# Patient Record
Sex: MALE | Race: WHITE | URBAN - METROPOLITAN AREA
[De-identification: names, ages, dates, MRNs, and addresses within clinical notes are randomized per-mention and may not be internally consistent; named-entity substitution may affect disease eponyms.]

---

## 2018-01-01 ENCOUNTER — HOSPITAL ENCOUNTER (OUTPATIENT)
Facility: MEDICAL CENTER | Age: 58
End: 2018-03-29
Attending: UROLOGY
Payer: COMMERCIAL

## 2018-01-01 ENCOUNTER — TELEPHONE (OUTPATIENT)
Dept: VASCULAR LAB | Facility: MEDICAL CENTER | Age: 58
End: 2018-01-01

## 2018-01-01 ENCOUNTER — APPOINTMENT (OUTPATIENT)
Dept: RADIOLOGY | Facility: MEDICAL CENTER | Age: 58
DRG: 246 | End: 2018-01-01
Attending: EMERGENCY MEDICINE
Payer: MEDICAID

## 2018-01-01 ENCOUNTER — HOSPITAL ENCOUNTER (INPATIENT)
Facility: MEDICAL CENTER | Age: 58
LOS: 3 days | DRG: 246 | End: 2018-09-28
Attending: EMERGENCY MEDICINE | Admitting: HOSPITALIST
Payer: MEDICAID

## 2018-01-01 ENCOUNTER — HOSPITAL ENCOUNTER (EMERGENCY)
Facility: MEDICAL CENTER | Age: 58
End: 2018-09-29
Attending: EMERGENCY MEDICINE
Payer: MEDICAID

## 2018-01-01 ENCOUNTER — APPOINTMENT (OUTPATIENT)
Dept: CARDIOLOGY | Facility: MEDICAL CENTER | Age: 58
DRG: 246 | End: 2018-01-01
Attending: HOSPITALIST
Payer: MEDICAID

## 2018-01-01 VITALS
WEIGHT: 169.97 LBS | TEMPERATURE: 97 F | DIASTOLIC BLOOD PRESSURE: 64 MMHG | HEART RATE: 80 BPM | BODY MASS INDEX: 25.18 KG/M2 | HEIGHT: 69 IN | OXYGEN SATURATION: 94 % | SYSTOLIC BLOOD PRESSURE: 116 MMHG | RESPIRATION RATE: 18 BRPM

## 2018-01-01 VITALS — WEIGHT: 169 LBS | BODY MASS INDEX: 24.96 KG/M2

## 2018-01-01 DIAGNOSIS — I21.09 ACUTE ANTERIOR WALL MI (HCC): ICD-10-CM

## 2018-01-01 DIAGNOSIS — I46.9 CARDIAC ARREST (HCC): ICD-10-CM

## 2018-01-01 LAB
ACT BLD: 373 SEC (ref 74–137)
ALBUMIN SERPL BCP-MCNC: 4 G/DL (ref 3.2–4.9)
ALBUMIN/GLOB SERPL: 1.3 G/DL
ALP SERPL-CCNC: 32 U/L (ref 30–99)
ALT SERPL-CCNC: 38 U/L (ref 2–50)
ANION GAP SERPL CALC-SCNC: 10 MMOL/L (ref 0–11.9)
ANION GAP SERPL CALC-SCNC: 7 MMOL/L (ref 0–11.9)
ANION GAP SERPL CALC-SCNC: 8 MMOL/L (ref 0–11.9)
ANION GAP SERPL CALC-SCNC: 8 MMOL/L (ref 0–11.9)
APTT PPP: 23.1 SEC (ref 24.7–36)
AST SERPL-CCNC: 101 U/L (ref 12–45)
BASOPHILS # BLD AUTO: 0.3 % (ref 0–1.8)
BASOPHILS # BLD: 0.03 K/UL (ref 0–0.12)
BILIRUB SERPL-MCNC: 1 MG/DL (ref 0.1–1.5)
BNP SERPL-MCNC: 400 PG/ML (ref 0–100)
BUN SERPL-MCNC: 11 MG/DL (ref 8–22)
BUN SERPL-MCNC: 11 MG/DL (ref 8–22)
BUN SERPL-MCNC: 14 MG/DL (ref 8–22)
BUN SERPL-MCNC: 21 MG/DL (ref 8–22)
CALCIUM SERPL-MCNC: 10.1 MG/DL (ref 8.5–10.5)
CALCIUM SERPL-MCNC: 9.2 MG/DL (ref 8.5–10.5)
CALCIUM SERPL-MCNC: 9.9 MG/DL (ref 8.5–10.5)
CALCIUM SERPL-MCNC: 9.9 MG/DL (ref 8.5–10.5)
CHLORIDE SERPL-SCNC: 101 MMOL/L (ref 96–112)
CHLORIDE SERPL-SCNC: 101 MMOL/L (ref 96–112)
CHLORIDE SERPL-SCNC: 102 MMOL/L (ref 96–112)
CHLORIDE SERPL-SCNC: 104 MMOL/L (ref 96–112)
CHOLEST SERPL-MCNC: 123 MG/DL (ref 100–199)
CO2 SERPL-SCNC: 24 MMOL/L (ref 20–33)
CO2 SERPL-SCNC: 24 MMOL/L (ref 20–33)
CO2 SERPL-SCNC: 25 MMOL/L (ref 20–33)
CO2 SERPL-SCNC: 27 MMOL/L (ref 20–33)
CREAT SERPL-MCNC: 0.92 MG/DL (ref 0.5–1.4)
CREAT SERPL-MCNC: 0.99 MG/DL (ref 0.5–1.4)
CREAT SERPL-MCNC: 0.99 MG/DL (ref 0.5–1.4)
CREAT SERPL-MCNC: 1.16 MG/DL (ref 0.5–1.4)
CYTOLOGY REG CYTOL: NORMAL
EKG IMPRESSION: NORMAL
EOSINOPHIL # BLD AUTO: 0.15 K/UL (ref 0–0.51)
EOSINOPHIL NFR BLD: 1.4 % (ref 0–6.9)
ERYTHROCYTE [DISTWIDTH] IN BLOOD BY AUTOMATED COUNT: 41.9 FL (ref 35.9–50)
ERYTHROCYTE [DISTWIDTH] IN BLOOD BY AUTOMATED COUNT: 43 FL (ref 35.9–50)
ERYTHROCYTE [DISTWIDTH] IN BLOOD BY AUTOMATED COUNT: 43.7 FL (ref 35.9–50)
EST. AVERAGE GLUCOSE BLD GHB EST-MCNC: 123 MG/DL
GLOBULIN SER CALC-MCNC: 3 G/DL (ref 1.9–3.5)
GLUCOSE SERPL-MCNC: 113 MG/DL (ref 65–99)
GLUCOSE SERPL-MCNC: 118 MG/DL (ref 65–99)
GLUCOSE SERPL-MCNC: 129 MG/DL (ref 65–99)
GLUCOSE SERPL-MCNC: 77 MG/DL (ref 65–99)
HBA1C MFR BLD: 5.9 % (ref 0–5.6)
HCT VFR BLD AUTO: 38.7 % (ref 42–52)
HCT VFR BLD AUTO: 41.5 % (ref 42–52)
HCT VFR BLD AUTO: 45.8 % (ref 42–52)
HDLC SERPL-MCNC: 37 MG/DL
HGB BLD-MCNC: 12.7 G/DL (ref 14–18)
HGB BLD-MCNC: 14.1 G/DL (ref 14–18)
HGB BLD-MCNC: 15.7 G/DL (ref 14–18)
IMM GRANULOCYTES # BLD AUTO: 0.03 K/UL (ref 0–0.11)
IMM GRANULOCYTES NFR BLD AUTO: 0.3 % (ref 0–0.9)
INR PPP: 0.97 (ref 0.87–1.13)
LDLC SERPL CALC-MCNC: 70 MG/DL
LIPASE SERPL-CCNC: 21 U/L (ref 11–82)
LV EJECT FRACT  99904: 25
LV EJECT FRACT MOD 2C 99903: 36.91
LV EJECT FRACT MOD 4C 99902: 29.38
LV EJECT FRACT MOD BP 99901: 37.74
LYMPHOCYTES # BLD AUTO: 1.86 K/UL (ref 1–4.8)
LYMPHOCYTES NFR BLD: 16.8 % (ref 22–41)
MAGNESIUM SERPL-MCNC: 2 MG/DL (ref 1.5–2.5)
MAGNESIUM SERPL-MCNC: 2.1 MG/DL (ref 1.5–2.5)
MAGNESIUM SERPL-MCNC: 2.2 MG/DL (ref 1.5–2.5)
MCH RBC QN AUTO: 29.6 PG (ref 27–33)
MCH RBC QN AUTO: 30 PG (ref 27–33)
MCH RBC QN AUTO: 30.5 PG (ref 27–33)
MCHC RBC AUTO-ENTMCNC: 32.9 G/DL (ref 33.7–35.3)
MCHC RBC AUTO-ENTMCNC: 34 G/DL (ref 33.7–35.3)
MCHC RBC AUTO-ENTMCNC: 34.3 G/DL (ref 33.7–35.3)
MCV RBC AUTO: 87.4 FL (ref 81.4–97.8)
MCV RBC AUTO: 89.6 FL (ref 81.4–97.8)
MCV RBC AUTO: 90 FL (ref 81.4–97.8)
MONOCYTES # BLD AUTO: 1.35 K/UL (ref 0–0.85)
MONOCYTES NFR BLD AUTO: 12.2 % (ref 0–13.4)
NEUTROPHILS # BLD AUTO: 7.66 K/UL (ref 1.82–7.42)
NEUTROPHILS NFR BLD: 69 % (ref 44–72)
NRBC # BLD AUTO: 0 K/UL
NRBC BLD-RTO: 0 /100 WBC
PLATELET # BLD AUTO: 220 K/UL (ref 164–446)
PLATELET # BLD AUTO: 242 K/UL (ref 164–446)
PLATELET # BLD AUTO: 264 K/UL (ref 164–446)
PMV BLD AUTO: 9.1 FL (ref 9–12.9)
PMV BLD AUTO: 9.5 FL (ref 9–12.9)
PMV BLD AUTO: 9.7 FL (ref 9–12.9)
POTASSIUM SERPL-SCNC: 3.9 MMOL/L (ref 3.6–5.5)
POTASSIUM SERPL-SCNC: 4.1 MMOL/L (ref 3.6–5.5)
POTASSIUM SERPL-SCNC: 4.3 MMOL/L (ref 3.6–5.5)
POTASSIUM SERPL-SCNC: 4.6 MMOL/L (ref 3.6–5.5)
PROT SERPL-MCNC: 7 G/DL (ref 6–8.2)
PROTHROMBIN TIME: 13 SEC (ref 12–14.6)
RBC # BLD AUTO: 4.22 M/UL (ref 4.7–6.1)
RBC # BLD AUTO: 4.63 M/UL (ref 4.7–6.1)
RBC # BLD AUTO: 5.24 M/UL (ref 4.7–6.1)
SODIUM SERPL-SCNC: 135 MMOL/L (ref 135–145)
SODIUM SERPL-SCNC: 136 MMOL/L (ref 135–145)
TRIGL SERPL-MCNC: 81 MG/DL (ref 0–149)
TROPONIN I SERPL-MCNC: 36.91 NG/ML (ref 0–0.04)
WBC # BLD AUTO: 11.1 K/UL (ref 4.8–10.8)
WBC # BLD AUTO: 11.5 K/UL (ref 4.8–10.8)
WBC # BLD AUTO: 11.5 K/UL (ref 4.8–10.8)

## 2018-01-01 PROCEDURE — 700101 HCHG RX REV CODE 250

## 2018-01-01 PROCEDURE — C1725 CATH, TRANSLUMIN NON-LASER: HCPCS

## 2018-01-01 PROCEDURE — 83735 ASSAY OF MAGNESIUM: CPT

## 2018-01-01 PROCEDURE — 80061 LIPID PANEL: CPT

## 2018-01-01 PROCEDURE — 99232 SBSQ HOSP IP/OBS MODERATE 35: CPT | Performed by: INTERNAL MEDICINE

## 2018-01-01 PROCEDURE — 770020 HCHG ROOM/CARE - TELE (206)

## 2018-01-01 PROCEDURE — 700102 HCHG RX REV CODE 250 W/ 637 OVERRIDE(OP): Performed by: HOSPITALIST

## 2018-01-01 PROCEDURE — 80048 BASIC METABOLIC PNL TOTAL CA: CPT

## 2018-01-01 PROCEDURE — A9270 NON-COVERED ITEM OR SERVICE: HCPCS | Performed by: INTERNAL MEDICINE

## 2018-01-01 PROCEDURE — 93454 CORONARY ARTERY ANGIO S&I: CPT | Mod: XU

## 2018-01-01 PROCEDURE — 700105 HCHG RX REV CODE 258

## 2018-01-01 PROCEDURE — 99239 HOSP IP/OBS DSCHRG MGMT >30: CPT | Performed by: INTERNAL MEDICINE

## 2018-01-01 PROCEDURE — 71045 X-RAY EXAM CHEST 1 VIEW: CPT

## 2018-01-01 PROCEDURE — 93010 ELECTROCARDIOGRAM REPORT: CPT | Performed by: INTERNAL MEDICINE

## 2018-01-01 PROCEDURE — 36415 COLL VENOUS BLD VENIPUNCTURE: CPT

## 2018-01-01 PROCEDURE — G8980 MOBILITY D/C STATUS: HCPCS | Mod: CI

## 2018-01-01 PROCEDURE — 770022 HCHG ROOM/CARE - ICU (200)

## 2018-01-01 PROCEDURE — 99153 MOD SED SAME PHYS/QHP EA: CPT

## 2018-01-01 PROCEDURE — C1769 GUIDE WIRE: HCPCS

## 2018-01-01 PROCEDURE — 700102 HCHG RX REV CODE 250 W/ 637 OVERRIDE(OP): Performed by: INTERNAL MEDICINE

## 2018-01-01 PROCEDURE — C1887 CATHETER, GUIDING: HCPCS

## 2018-01-01 PROCEDURE — C1894 INTRO/SHEATH, NON-LASER: HCPCS

## 2018-01-01 PROCEDURE — G8979 MOBILITY GOAL STATUS: HCPCS | Mod: CI

## 2018-01-01 PROCEDURE — A9270 NON-COVERED ITEM OR SERVICE: HCPCS | Performed by: EMERGENCY MEDICINE

## 2018-01-01 PROCEDURE — 92950 HEART/LUNG RESUSCITATION CPR: CPT

## 2018-01-01 PROCEDURE — 85027 COMPLETE CBC AUTOMATED: CPT

## 2018-01-01 PROCEDURE — 700105 HCHG RX REV CODE 258: Performed by: HOSPITALIST

## 2018-01-01 PROCEDURE — C9601 PERC DRUG-EL COR STENT BRAN: HCPCS | Mod: RC

## 2018-01-01 PROCEDURE — 700111 HCHG RX REV CODE 636 W/ 250 OVERRIDE (IP): Performed by: HOSPITALIST

## 2018-01-01 PROCEDURE — 85347 COAGULATION TIME ACTIVATED: CPT

## 2018-01-01 PROCEDURE — 93005 ELECTROCARDIOGRAM TRACING: CPT | Performed by: EMERGENCY MEDICINE

## 2018-01-01 PROCEDURE — 90732 PPSV23 VACC 2 YRS+ SUBQ/IM: CPT | Performed by: HOSPITALIST

## 2018-01-01 PROCEDURE — 84484 ASSAY OF TROPONIN QUANT: CPT

## 2018-01-01 PROCEDURE — 93005 ELECTROCARDIOGRAM TRACING: CPT | Performed by: INTERNAL MEDICINE

## 2018-01-01 PROCEDURE — 83690 ASSAY OF LIPASE: CPT

## 2018-01-01 PROCEDURE — 93005 ELECTROCARDIOGRAM TRACING: CPT

## 2018-01-01 PROCEDURE — 99291 CRITICAL CARE FIRST HOUR: CPT | Performed by: INTERNAL MEDICINE

## 2018-01-01 PROCEDURE — G8978 MOBILITY CURRENT STATUS: HCPCS | Mod: CI

## 2018-01-01 PROCEDURE — 94770 HCHG CO2 EXPIRED GAS DETERMINATION: CPT

## 2018-01-01 PROCEDURE — 83036 HEMOGLOBIN GLYCOSYLATED A1C: CPT

## 2018-01-01 PROCEDURE — 90686 IIV4 VACC NO PRSV 0.5 ML IM: CPT | Performed by: HOSPITALIST

## 2018-01-01 PROCEDURE — A9270 NON-COVERED ITEM OR SERVICE: HCPCS | Performed by: NURSE PRACTITIONER

## 2018-01-01 PROCEDURE — 700117 HCHG RX CONTRAST REV CODE 255: Performed by: INTERNAL MEDICINE

## 2018-01-01 PROCEDURE — 92941 PRQ TRLML REVSC TOT OCCL AMI: CPT | Mod: LD | Performed by: INTERNAL MEDICINE

## 2018-01-01 PROCEDURE — 360979 HCHG DIAGNOSTIC CATH

## 2018-01-01 PROCEDURE — 80053 COMPREHEN METABOLIC PANEL: CPT

## 2018-01-01 PROCEDURE — A9270 NON-COVERED ITEM OR SERVICE: HCPCS | Performed by: HOSPITALIST

## 2018-01-01 PROCEDURE — 700102 HCHG RX REV CODE 250 W/ 637 OVERRIDE(OP): Performed by: NURSE PRACTITIONER

## 2018-01-01 PROCEDURE — 99232 SBSQ HOSP IP/OBS MODERATE 35: CPT | Mod: 25 | Performed by: HOSPITALIST

## 2018-01-01 PROCEDURE — 700111 HCHG RX REV CODE 636 W/ 250 OVERRIDE (IP): Performed by: EMERGENCY MEDICINE

## 2018-01-01 PROCEDURE — 700102 HCHG RX REV CODE 250 W/ 637 OVERRIDE(OP): Performed by: EMERGENCY MEDICINE

## 2018-01-01 PROCEDURE — 99406 BEHAV CHNG SMOKING 3-10 MIN: CPT | Mod: 25 | Performed by: HOSPITALIST

## 2018-01-01 PROCEDURE — 83880 ASSAY OF NATRIURETIC PEPTIDE: CPT

## 2018-01-01 PROCEDURE — 027237Z DILATION OF CORONARY ARTERY, THREE ARTERIES WITH FOUR OR MORE DRUG-ELUTING INTRALUMINAL DEVICES, PERCUTANEOUS APPROACH: ICD-10-PCS | Performed by: INTERNAL MEDICINE

## 2018-01-01 PROCEDURE — 92928 PRQ TCAT PLMT NTRAC ST 1 LES: CPT | Mod: 59,RC | Performed by: INTERNAL MEDICINE

## 2018-01-01 PROCEDURE — 307093 HCHG TR BAND RADIAL

## 2018-01-01 PROCEDURE — 85610 PROTHROMBIN TIME: CPT

## 2018-01-01 PROCEDURE — C9606 PERC D-E COR REVASC W AMI S: HCPCS | Mod: LD

## 2018-01-01 PROCEDURE — 93306 TTE W/DOPPLER COMPLETE: CPT

## 2018-01-01 PROCEDURE — 93306 TTE W/DOPPLER COMPLETE: CPT | Mod: 26 | Performed by: INTERNAL MEDICINE

## 2018-01-01 PROCEDURE — C1874 STENT, COATED/COV W/DEL SYS: HCPCS

## 2018-01-01 PROCEDURE — 700102 HCHG RX REV CODE 250 W/ 637 OVERRIDE(OP)

## 2018-01-01 PROCEDURE — 93454 CORONARY ARTERY ANGIO S&I: CPT | Mod: 26,59 | Performed by: INTERNAL MEDICINE

## 2018-01-01 PROCEDURE — 90471 IMMUNIZATION ADMIN: CPT

## 2018-01-01 PROCEDURE — 99291 CRITICAL CARE FIRST HOUR: CPT

## 2018-01-01 PROCEDURE — A9270 NON-COVERED ITEM OR SERVICE: HCPCS

## 2018-01-01 PROCEDURE — B2111ZZ FLUOROSCOPY OF MULTIPLE CORONARY ARTERIES USING LOW OSMOLAR CONTRAST: ICD-10-PCS | Performed by: INTERNAL MEDICINE

## 2018-01-01 PROCEDURE — 700111 HCHG RX REV CODE 636 W/ 250 OVERRIDE (IP)

## 2018-01-01 PROCEDURE — 3E0234Z INTRODUCTION OF SERUM, TOXOID AND VACCINE INTO MUSCLE, PERCUTANEOUS APPROACH: ICD-10-PCS | Performed by: HOSPITALIST

## 2018-01-01 PROCEDURE — 304952 HCHG R 2 PADS

## 2018-01-01 PROCEDURE — 99152 MOD SED SAME PHYS/QHP 5/>YRS: CPT

## 2018-01-01 PROCEDURE — C9601 PERC DRUG-EL COR STENT BRAN: HCPCS | Mod: LC

## 2018-01-01 PROCEDURE — 85730 THROMBOPLASTIN TIME PARTIAL: CPT

## 2018-01-01 PROCEDURE — 99255 IP/OBS CONSLTJ NEW/EST HI 80: CPT | Performed by: INTERNAL MEDICINE

## 2018-01-01 PROCEDURE — 97161 PT EVAL LOW COMPLEX 20 MIN: CPT

## 2018-01-01 PROCEDURE — 85025 COMPLETE CBC W/AUTO DIFF WBC: CPT

## 2018-01-01 PROCEDURE — 99223 1ST HOSP IP/OBS HIGH 75: CPT | Performed by: HOSPITALIST

## 2018-01-01 PROCEDURE — 99285 EMERGENCY DEPT VISIT HI MDM: CPT

## 2018-01-01 PROCEDURE — 88112 CYTOPATH CELL ENHANCE TECH: CPT

## 2018-01-01 RX ORDER — NITROGLYCERIN 20 MG/100ML
5 INJECTION INTRAVENOUS ONCE
Status: DISCONTINUED | OUTPATIENT
Start: 2018-01-01 | End: 2018-01-01

## 2018-01-01 RX ORDER — SODIUM CHLORIDE 9 MG/ML
INJECTION, SOLUTION INTRAVENOUS CONTINUOUS
Status: ACTIVE | OUTPATIENT
Start: 2018-01-01 | End: 2018-01-01

## 2018-01-01 RX ORDER — LISINOPRIL 5 MG/1
2.5 TABLET ORAL
Status: DISCONTINUED | OUTPATIENT
Start: 2018-01-01 | End: 2018-01-01 | Stop reason: HOSPADM

## 2018-01-01 RX ORDER — SODIUM CHLORIDE 9 MG/ML
500 INJECTION, SOLUTION INTRAVENOUS ONCE
Status: COMPLETED | OUTPATIENT
Start: 2018-01-01 | End: 2018-01-01

## 2018-01-01 RX ORDER — METOPROLOL SUCCINATE 50 MG/1
50 TABLET, EXTENDED RELEASE ORAL EVERY EVENING
Qty: 30 TAB | Refills: 1 | Status: SHIPPED | OUTPATIENT
Start: 2018-01-01

## 2018-01-01 RX ORDER — FUROSEMIDE 20 MG/1
20 TABLET ORAL
Status: DISCONTINUED | OUTPATIENT
Start: 2018-01-01 | End: 2018-01-01

## 2018-01-01 RX ORDER — NITROGLYCERIN 0.4 MG/1
0.4 TABLET SUBLINGUAL ONCE
Status: COMPLETED | OUTPATIENT
Start: 2018-01-01 | End: 2018-01-01

## 2018-01-01 RX ORDER — LIDOCAINE HYDROCHLORIDE 20 MG/ML
INJECTION, SOLUTION INFILTRATION; PERINEURAL
Status: COMPLETED
Start: 2018-01-01 | End: 2018-01-01

## 2018-01-01 RX ORDER — LISINOPRIL 5 MG/1
5 TABLET ORAL
Status: DISCONTINUED | OUTPATIENT
Start: 2018-01-01 | End: 2018-01-01

## 2018-01-01 RX ORDER — FUROSEMIDE 20 MG/1
20 TABLET ORAL
Status: DISCONTINUED | OUTPATIENT
Start: 2018-01-01 | End: 2018-01-01 | Stop reason: HOSPADM

## 2018-01-01 RX ORDER — POTASSIUM CHLORIDE 20 MEQ/1
20 TABLET, EXTENDED RELEASE ORAL 2 TIMES DAILY
Status: DISCONTINUED | OUTPATIENT
Start: 2018-01-01 | End: 2018-01-01

## 2018-01-01 RX ORDER — METOPROLOL SUCCINATE 50 MG/1
50 TABLET, EXTENDED RELEASE ORAL EVERY EVENING
Status: DISCONTINUED | OUTPATIENT
Start: 2018-01-01 | End: 2018-01-01 | Stop reason: HOSPADM

## 2018-01-01 RX ORDER — SPIRONOLACTONE 25 MG/1
25 TABLET ORAL
Status: DISCONTINUED | OUTPATIENT
Start: 2018-01-01 | End: 2018-01-01 | Stop reason: HOSPADM

## 2018-01-01 RX ORDER — METOPROLOL SUCCINATE 50 MG/1
50 TABLET, EXTENDED RELEASE ORAL EVERY EVENING
Status: DISCONTINUED | OUTPATIENT
Start: 2018-01-01 | End: 2018-01-01

## 2018-01-01 RX ORDER — SPIRONOLACTONE 25 MG/1
12.5 TABLET ORAL
Status: DISCONTINUED | OUTPATIENT
Start: 2018-01-01 | End: 2018-01-01

## 2018-01-01 RX ORDER — LISINOPRIL 2.5 MG/1
2.5 TABLET ORAL DAILY
Qty: 30 TAB | Refills: 1 | Status: SHIPPED | OUTPATIENT
Start: 2018-01-01

## 2018-01-01 RX ORDER — ACETAMINOPHEN 325 MG/1
650 TABLET ORAL EVERY 6 HOURS PRN
Status: DISCONTINUED | OUTPATIENT
Start: 2018-01-01 | End: 2018-01-01 | Stop reason: HOSPADM

## 2018-01-01 RX ORDER — ATORVASTATIN CALCIUM 80 MG/1
80 TABLET, FILM COATED ORAL
Qty: 30 TAB | Refills: 1 | Status: SHIPPED | OUTPATIENT
Start: 2018-01-01

## 2018-01-01 RX ORDER — SODIUM CHLORIDE 9 MG/ML
INJECTION, SOLUTION INTRAVENOUS
Status: COMPLETED
Start: 2018-01-01 | End: 2018-01-01

## 2018-01-01 RX ORDER — MIDAZOLAM HYDROCHLORIDE 1 MG/ML
INJECTION INTRAMUSCULAR; INTRAVENOUS
Status: COMPLETED
Start: 2018-01-01 | End: 2018-01-01

## 2018-01-01 RX ORDER — HEPARIN SODIUM,PORCINE 1000/ML
VIAL (ML) INJECTION
Status: COMPLETED
Start: 2018-01-01 | End: 2018-01-01

## 2018-01-01 RX ORDER — LISINOPRIL 5 MG/1
2.5 TABLET ORAL
Status: DISCONTINUED | OUTPATIENT
Start: 2018-01-01 | End: 2018-01-01

## 2018-01-01 RX ORDER — POLYETHYLENE GLYCOL 3350 17 G/17G
1 POWDER, FOR SOLUTION ORAL
Status: DISCONTINUED | OUTPATIENT
Start: 2018-01-01 | End: 2018-01-01 | Stop reason: HOSPADM

## 2018-01-01 RX ORDER — SPIRONOLACTONE 25 MG/1
25 TABLET ORAL DAILY
Qty: 30 TAB | Refills: 3 | Status: SHIPPED | OUTPATIENT
Start: 2018-01-01

## 2018-01-01 RX ORDER — LIDOCAINE HYDROCHLORIDE 10 MG/ML
INJECTION, SOLUTION INFILTRATION; PERINEURAL
Status: COMPLETED
Start: 2018-01-01 | End: 2018-01-01

## 2018-01-01 RX ORDER — VERAPAMIL HYDROCHLORIDE 2.5 MG/ML
INJECTION, SOLUTION INTRAVENOUS
Status: COMPLETED
Start: 2018-01-01 | End: 2018-01-01

## 2018-01-01 RX ORDER — BISACODYL 10 MG
10 SUPPOSITORY, RECTAL RECTAL
Status: DISCONTINUED | OUTPATIENT
Start: 2018-01-01 | End: 2018-01-01 | Stop reason: HOSPADM

## 2018-01-01 RX ORDER — ATORVASTATIN CALCIUM 80 MG/1
80 TABLET, FILM COATED ORAL
Status: DISCONTINUED | OUTPATIENT
Start: 2018-01-01 | End: 2018-01-01 | Stop reason: HOSPADM

## 2018-01-01 RX ORDER — CALCIUM CHLORIDE 100 MG/ML
INJECTION INTRAVENOUS; INTRAVENTRICULAR
Status: COMPLETED | OUTPATIENT
Start: 2018-01-01 | End: 2018-01-01

## 2018-01-01 RX ORDER — ASPIRIN 81 MG/1
81 TABLET ORAL DAILY
Qty: 30 TAB | Refills: 1 | Status: SHIPPED | OUTPATIENT
Start: 2018-01-01

## 2018-01-01 RX ORDER — METOPROLOL SUCCINATE 50 MG/1
100 TABLET, EXTENDED RELEASE ORAL EVERY EVENING
Status: DISCONTINUED | OUTPATIENT
Start: 2018-01-01 | End: 2018-01-01

## 2018-01-01 RX ORDER — ASPIRIN 81 MG/1
324 TABLET, CHEWABLE ORAL ONCE
Status: COMPLETED | OUTPATIENT
Start: 2018-01-01 | End: 2018-01-01

## 2018-01-01 RX ORDER — FUROSEMIDE 20 MG/1
20 TABLET ORAL DAILY
Qty: 60 TAB | Refills: 1 | Status: SHIPPED | OUTPATIENT
Start: 2018-01-01

## 2018-01-01 RX ORDER — NICOTINE 21 MG/24HR
21 PATCH, TRANSDERMAL 24 HOURS TRANSDERMAL
Status: DISCONTINUED | OUTPATIENT
Start: 2018-01-01 | End: 2018-01-01 | Stop reason: HOSPADM

## 2018-01-01 RX ORDER — AMOXICILLIN 250 MG
2 CAPSULE ORAL 2 TIMES DAILY
Status: DISCONTINUED | OUTPATIENT
Start: 2018-01-01 | End: 2018-01-01 | Stop reason: HOSPADM

## 2018-01-01 RX ADMIN — CALCIUM CHLORIDE 1 G: 100 INJECTION, SOLUTION INTRAVENOUS at 17:14

## 2018-01-01 RX ADMIN — SODIUM BICARBONATE 50 MEQ: 84 INJECTION, SOLUTION INTRAVENOUS at 17:16

## 2018-01-01 RX ADMIN — ATORVASTATIN CALCIUM 80 MG: 80 TABLET, FILM COATED ORAL at 21:29

## 2018-01-01 RX ADMIN — ENOXAPARIN SODIUM 40 MG: 100 INJECTION SUBCUTANEOUS at 05:07

## 2018-01-01 RX ADMIN — TICAGRELOR 180 MG: 90 TABLET ORAL at 15:19

## 2018-01-01 RX ADMIN — POTASSIUM CHLORIDE 20 MEQ: 1500 TABLET, EXTENDED RELEASE ORAL at 08:57

## 2018-01-01 RX ADMIN — ASPIRIN 81 MG: 81 TABLET, COATED ORAL at 05:52

## 2018-01-01 RX ADMIN — EPINEPHRINE 1 MG: 0.1 INJECTION, SOLUTION ENDOTRACHEAL; INTRACARDIAC; INTRAVENOUS at 17:09

## 2018-01-01 RX ADMIN — FUROSEMIDE 20 MG: 20 TABLET ORAL at 08:57

## 2018-01-01 RX ADMIN — SPIRONOLACTONE 25 MG: 25 TABLET ORAL at 05:53

## 2018-01-01 RX ADMIN — FUROSEMIDE 20 MG: 20 TABLET ORAL at 17:57

## 2018-01-01 RX ADMIN — SPIRONOLACTONE 12.5 MG: 25 TABLET ORAL at 05:07

## 2018-01-01 RX ADMIN — TICAGRELOR 90 MG: 90 TABLET ORAL at 05:08

## 2018-01-01 RX ADMIN — NITROGLYCERIN 10 ML: 20 INJECTION INTRAVENOUS at 14:45

## 2018-01-01 RX ADMIN — METOPROLOL TARTRATE 25 MG: 25 TABLET, FILM COATED ORAL at 05:07

## 2018-01-01 RX ADMIN — HEPARIN SODIUM 1000 UNITS: 1000 INJECTION, SOLUTION INTRAVENOUS; SUBCUTANEOUS at 15:19

## 2018-01-01 RX ADMIN — FUROSEMIDE 20 MG: 20 TABLET ORAL at 05:07

## 2018-01-01 RX ADMIN — FENTANYL CITRATE 100 MCG: 50 INJECTION INTRAMUSCULAR; INTRAVENOUS at 14:58

## 2018-01-01 RX ADMIN — LISINOPRIL 2.5 MG: 5 TABLET ORAL at 12:08

## 2018-01-01 RX ADMIN — VERAPAMIL HYDROCHLORIDE 2.5 MG: 2.5 INJECTION, SOLUTION INTRAVENOUS at 14:45

## 2018-01-01 RX ADMIN — ASPIRIN 324 MG: 81 TABLET, CHEWABLE ORAL at 13:41

## 2018-01-01 RX ADMIN — HEPARIN SODIUM: 1000 INJECTION, SOLUTION INTRAVENOUS; SUBCUTANEOUS at 14:45

## 2018-01-01 RX ADMIN — ASPIRIN 81 MG: 81 TABLET, COATED ORAL at 06:02

## 2018-01-01 RX ADMIN — ACETAMINOPHEN 650 MG: 325 TABLET, FILM COATED ORAL at 08:57

## 2018-01-01 RX ADMIN — SODIUM CHLORIDE: 9 INJECTION, SOLUTION INTRAVENOUS at 16:27

## 2018-01-01 RX ADMIN — TICAGRELOR 90 MG: 90 TABLET ORAL at 17:36

## 2018-01-01 RX ADMIN — SENNOSIDES AND DOCUSATE SODIUM 2 TABLET: 8.6; 5 TABLET ORAL at 05:08

## 2018-01-01 RX ADMIN — NICOTINE 21 MG: 21 PATCH, EXTENDED RELEASE TRANSDERMAL at 05:53

## 2018-01-01 RX ADMIN — TICAGRELOR 90 MG: 90 TABLET ORAL at 06:03

## 2018-01-01 RX ADMIN — NICOTINE 21 MG: 21 PATCH, EXTENDED RELEASE TRANSDERMAL at 18:15

## 2018-01-01 RX ADMIN — NICOTINE 21 MG: 21 PATCH, EXTENDED RELEASE TRANSDERMAL at 06:05

## 2018-01-01 RX ADMIN — METOPROLOL SUCCINATE 50 MG: 50 TABLET, EXTENDED RELEASE ORAL at 17:37

## 2018-01-01 RX ADMIN — TICAGRELOR 90 MG: 90 TABLET ORAL at 05:53

## 2018-01-01 RX ADMIN — LIDOCAINE HYDROCHLORIDE: 10 INJECTION, SOLUTION INFILTRATION; PERINEURAL at 14:45

## 2018-01-01 RX ADMIN — ATORVASTATIN CALCIUM 80 MG: 80 TABLET, FILM COATED ORAL at 20:04

## 2018-01-01 RX ADMIN — POTASSIUM CHLORIDE 20 MEQ: 1500 TABLET, EXTENDED RELEASE ORAL at 05:07

## 2018-01-01 RX ADMIN — POTASSIUM CHLORIDE 20 MEQ: 1500 TABLET, EXTENDED RELEASE ORAL at 17:57

## 2018-01-01 RX ADMIN — LIDOCAINE HYDROCHLORIDE 100 MG: 20 INJECTION, SOLUTION INTRAVENOUS at 17:13

## 2018-01-01 RX ADMIN — MIDAZOLAM HYDROCHLORIDE 2 MG: 1 INJECTION, SOLUTION INTRAMUSCULAR; INTRAVENOUS at 14:58

## 2018-01-01 RX ADMIN — SODIUM CHLORIDE 500 ML: 9 INJECTION, SOLUTION INTRAVENOUS at 02:54

## 2018-01-01 RX ADMIN — PNEUMOCOCCAL VACCINE POLYVALENT 25 MCG
25; 25; 25; 25; 25; 25; 25; 25; 25; 25; 25; 25; 25; 25; 25; 25; 25; 25; 25; 25; 25; 25; 25 INJECTION, SOLUTION INTRAMUSCULAR; SUBCUTANEOUS at 18:10

## 2018-01-01 RX ADMIN — TICAGRELOR 90 MG: 90 TABLET ORAL at 17:58

## 2018-01-01 RX ADMIN — NITROGLYCERIN 0.4 MG: 0.4 TABLET SUBLINGUAL at 13:41

## 2018-01-01 RX ADMIN — ASPIRIN 81 MG: 81 TABLET, COATED ORAL at 05:08

## 2018-01-01 RX ADMIN — INFLUENZA A VIRUS A/MICHIGAN/45/2015 X-275 (H1N1) ANTIGEN (FORMALDEHYDE INACTIVATED), INFLUENZA A VIRUS A/SINGAPORE/INFIMH-16-0019/2016 IVR-186 (H3N2) ANTIGEN (FORMALDEHYDE INACTIVATED), INFLUENZA B VIRUS B/PHUKET/3073/2013 ANTIGEN (FORMALDEHYDE INACTIVATED), AND INFLUENZA B VIRUS B/MARYLAND/15/2016 BX-69A ANTIGEN (FORMALDEHYDE INACTIVATED) 0.5 ML: 15; 15; 15; 15 INJECTION, SUSPENSION INTRAMUSCULAR at 18:06

## 2018-01-01 RX ADMIN — FUROSEMIDE 20 MG: 20 TABLET ORAL at 05:52

## 2018-01-01 RX ADMIN — SPIRONOLACTONE 12.5 MG: 25 TABLET ORAL at 08:57

## 2018-01-01 RX ADMIN — METOPROLOL TARTRATE 25 MG: 25 TABLET, FILM COATED ORAL at 17:57

## 2018-01-01 RX ADMIN — HEPARIN SODIUM 2000 UNITS: 200 INJECTION, SOLUTION INTRAVENOUS at 14:46

## 2018-01-01 RX ADMIN — ACETAMINOPHEN 650 MG: 325 TABLET, FILM COATED ORAL at 18:15

## 2018-01-01 RX ADMIN — NICOTINE 21 MG: 21 PATCH, EXTENDED RELEASE TRANSDERMAL at 05:07

## 2018-01-01 RX ADMIN — ATORVASTATIN CALCIUM 80 MG: 80 TABLET, FILM COATED ORAL at 22:44

## 2018-01-01 RX ADMIN — NITROGLYCERIN 0.4 MG: 0.4 TABLET SUBLINGUAL at 13:52

## 2018-01-01 RX ADMIN — METOPROLOL TARTRATE 25 MG: 25 TABLET, FILM COATED ORAL at 18:26

## 2018-01-01 RX ADMIN — IOHEXOL 143 ML: 350 INJECTION, SOLUTION INTRAVENOUS at 15:20

## 2018-01-01 ASSESSMENT — ENCOUNTER SYMPTOMS
APNEA: 0
SENSORY CHANGE: 0
NERVOUS/ANXIOUS: 0
FEVER: 0
DEPRESSION: 0
SORE THROAT: 0
BLURRED VISION: 0
COUGH: 0
BACK PAIN: 0
STRIDOR: 0
DIZZINESS: 0
VOMITING: 0
WEAKNESS: 0
NAUSEA: 0
ABDOMINAL PAIN: 0
PALPITATIONS: 0
BLURRED VISION: 0
SHORTNESS OF BREATH: 1
SHORTNESS OF BREATH: 0
SENSORY CHANGE: 0
CHILLS: 0
DEPRESSION: 0
NAUSEA: 0
PALPITATIONS: 0
DOUBLE VISION: 0
ABDOMINAL PAIN: 0
PALPITATIONS: 0
WHEEZING: 0
CHEST TIGHTNESS: 0
SHORTNESS OF BREATH: 0
DIZZINESS: 0
CHOKING: 0
FEVER: 0
FEVER: 0
CHILLS: 0
NERVOUS/ANXIOUS: 0
SHORTNESS OF BREATH: 0
COUGH: 0

## 2018-01-01 ASSESSMENT — GAIT ASSESSMENTS
GAIT LEVEL OF ASSIST: SUPERVISED
DISTANCE (FEET): 300

## 2018-01-01 ASSESSMENT — PAIN SCALES - GENERAL
PAINLEVEL_OUTOF10: 6
PAINLEVEL_OUTOF10: 0
PAINLEVEL_OUTOF10: 5
PAINLEVEL_OUTOF10: 3
PAINLEVEL_OUTOF10: 0
PAINLEVEL_OUTOF10: 5
PAINLEVEL_OUTOF10: 3
PAINLEVEL_OUTOF10: 5
PAINLEVEL_OUTOF10: 8
PAINLEVEL_OUTOF10: 0
PAINLEVEL_OUTOF10: 3
PAINLEVEL_OUTOF10: 8
PAINLEVEL_OUTOF10: 6
PAINLEVEL_OUTOF10: 0
PAINLEVEL_OUTOF10: 0
PAINLEVEL_OUTOF10: 5
PAINLEVEL_OUTOF10: 6
PAINLEVEL_OUTOF10: 10
PAINLEVEL_OUTOF10: 2
PAINLEVEL_OUTOF10: 6
PAINLEVEL_OUTOF10: 0
PAINLEVEL_OUTOF10: 0
PAINLEVEL_OUTOF10: 3
PAINLEVEL_OUTOF10: 6
PAINLEVEL_OUTOF10: 0
PAINLEVEL_OUTOF10: 0
PAINLEVEL_OUTOF10: 6
PAINLEVEL_OUTOF10: 0

## 2018-01-01 ASSESSMENT — PATIENT HEALTH QUESTIONNAIRE - PHQ9
1. LITTLE INTEREST OR PLEASURE IN DOING THINGS: NOT AT ALL
1. LITTLE INTEREST OR PLEASURE IN DOING THINGS: NOT AT ALL
SUM OF ALL RESPONSES TO PHQ9 QUESTIONS 1 AND 2: 0
1. LITTLE INTEREST OR PLEASURE IN DOING THINGS: NOT AT ALL
2. FEELING DOWN, DEPRESSED, IRRITABLE, OR HOPELESS: NOT AT ALL
SUM OF ALL RESPONSES TO PHQ9 QUESTIONS 1 AND 2: 0
1. LITTLE INTEREST OR PLEASURE IN DOING THINGS: NOT AT ALL
SUM OF ALL RESPONSES TO PHQ9 QUESTIONS 1 AND 2: 0
SUM OF ALL RESPONSES TO PHQ9 QUESTIONS 1 AND 2: 0

## 2018-01-01 ASSESSMENT — COGNITIVE AND FUNCTIONAL STATUS - GENERAL
SUGGESTED CMS G CODE MODIFIER MOBILITY: CJ
SUGGESTED CMS G CODE MODIFIER MOBILITY: CH
MOBILITY SCORE: 24
SUGGESTED CMS G CODE MODIFIER DAILY ACTIVITY: CH
DAILY ACTIVITIY SCORE: 24
WALKING IN HOSPITAL ROOM: A LITTLE
MOBILITY SCORE: 22
CLIMB 3 TO 5 STEPS WITH RAILING: A LITTLE

## 2018-01-01 ASSESSMENT — LIFESTYLE VARIABLES
EVER_SMOKED: YES
ALCOHOL_USE: NO

## 2018-09-25 PROBLEM — Z72.0 TOBACCO ABUSE: Status: ACTIVE | Noted: 2018-01-01

## 2018-09-25 PROBLEM — I21.02 ST ELEVATION MYOCARDIAL INFARCTION INVOLVING LEFT ANTERIOR DESCENDING (LAD) CORONARY ARTERY (HCC): Status: ACTIVE | Noted: 2018-01-01

## 2018-09-25 NOTE — DISCHARGE PLANNING
Referral: Stemi    Intervention: SW responded to a STEMI call.   Pt will be going to CATH lab.    Pt requesting his daughter Nakita be called at [phone] left a message .  Pt requested his mother be called at [phone]-Mother is Christel.  SW spoke with her and she lives in [place]. She is getting a ride and will be to the hospital with in the hour.     Daughter Nakita called and was notified-She works until 1600. Then will be here.    Plan: SW to keep in touch with family regarding Pt condition. SW will await family arrival and offer support and information as needed.

## 2018-09-25 NOTE — ED TRIAGE NOTES
".  Chief Complaint   Patient presents with   • Chest Pain     x 2 weeks, generalized chest pain radiating down BUE, (+) SOB, denies N/V     .BP (!) 99/62   Pulse (!) 109   Temp 36.6 °C (97.9 °F)   Resp 16   Ht 1.753 m (5' 9\")   Wt 76 kg (167 lb 8.8 oz)   SpO2 96%   BMI 24.74 kg/m²     Ambulatory to triage with above complaints, EKG done, patient to RD 12 w/ED tech  "

## 2018-09-25 NOTE — CONSULTS
DATE OF SERVICE:  09/25/2018    CARDIOLOGY CONSULTATION    REQUESTING PHYSICIAN:  Liang Henning DO, in the emergency room.    PATIENT IDENTIFICATION:  The patient is a 58-year-old male with no prior   history of coronary artery disease.  Cardiology consultation was requested   because of an acute ST-segment elevation myocardial infarction.    HISTORY OF PRESENT ILLNESS:  Patient states that he has had chest discomfort   intermittently over the last couple of weeks.  This has been a continuous   discomfort over the last couple of days that waxed and waned a bit.  He never   went away completely.  He states that it has been as high as a 10/10.  It is   currently an 8/10.  It is across the anterior chest and is a pressure-type   sensation.  It does radiate to his arms and may be associated with   diaphoresis.  He denies any associated nausea, vomiting, or shortness of   breath.  He notes no aggravating or alleviating factors.    Patient denies any difficulty with dyspnea on exertion, PND, orthopnea, or   edema.  He has had no palpitations or lightheadedness.    PAST MEDICAL HISTORY AND MEDICAL ILLNESSES:  Sciatica and right eye blindness.    ALLERGIES:  None known.    PAST SURGICAL HISTORY:  The patient has had left hip surgery and right wrist   surgery.    MEDICATIONS:  None.    SOCIAL HISTORY:  Patient is  and has one daughter.  He smokes about a   pack of cigarettes daily.  He does not drink.    FAMILY HISTORY:  Remarkable for his father having a coronary artery disease   with bypass graft surgery.  Patient does not know his father's age at the time   of this event.    REVIEW OF SYSTEMS:  Remainder of the [facility] review of systems is unobtainable   due to the fact the patient is going acutely to the cardiac catheterization   laboratory.    PHYSICAL EXAMINATION:  VITAL SIGNS:  Blood pressure 120/60, pulse of 100, respirations 30, O2 sat is   97%.  GENERAL APPEARANCE:  Alert, well-developed, middle-aged male,  in no acute   distress.  HEENT:  Patient's right eye is deviated to the right; however, the left eye   has intact extraocular movements.  Left eye has a normal reactivity.  Mouth,   poor oral hygiene with many carious teeth.  NECK:  No thyromegaly, cervical adenopathy, jugular venous distention noted.  CHEST:  Respiratory is normal.  LUNGS:  Clear to auscultation and percussion.  CARDIAC:  There is a regular rate and rhythm.  S1, S2 are grossly normal.    There is no S3 or S4 appreciated.  No murmurs, clicks, or rubs are noted.  ABDOMEN:  Soft, nontender, no hepatosplenomegaly noted.  MUSCULOSKELETAL:  Patient has no significant kyphoscoliosis.  Muscle strength   and tone are normal.  EXTREMITIES:  No clubbing, cyanosis, or edema is present.  Pulses are 2+ and   symmetrical.  No carotid bruits are noted.  SKIN:  Inspection and palpation noncontributory.  NEUROLOGIC:  No focal neurologic abnormalities noted.  PSYCHIATRIC:  The patient is alert and oriented x3.  Mood and affect are   appropriate.    LABORATORY DATA:  Sodium 135, potassium 4.6, BUN 11, creatinine 0.99.  AST was   mildly elevated at 101.  Troponin was elevated at 36.9.    EKG:  Patient's EKGs were reviewed by myself.  Patient's initial EKG from   12:54 this afternoon showed a mild sinus tachycardia with deep anterior T-wave   inversion.  There was approximately 1 mm of ST-segment elevation, which did   not meet criteria for a STEMI.  Repeat EKG at 14:11 this afternoon again   reviewed by myself.  This again showed sinus rhythm with a rate of   approximately 100 BPM.  There was a QS pattern in leads V1 and V2 with more   ST-segment elevation noted in lead V3.  This is greater than 2 mm.  However,   he did not have greater than 2 mm of ST-segment elevation in 2 contiguous   leads.    IMPRESSION:  1.  Anterior infarction.  The patient does not meet definitive criteria for a   STEMI.  However, given his ST-segment elevation in lead V3 and his ongoing    chest discomfort, he will go acutely to the cardiac catheterization and   revascularization as necessary.  2.  Smoking history.    PLAN:  Cardiac catheterization with revascularization as necessary.       ____________________________________     MD MANJU VILLEDA / BARBARA    DD:  09/25/2018 14:39:18  DT:  09/25/2018 15:55:25    D#:  [id number]  Job#:  [id number]

## 2018-09-25 NOTE — PROCEDURES
Cardiac Catheterization and Percutaneous Intervention Procedure Report    9/25/2018    Referring MD:     Primary cardiologist:      Primary Care Provider: No primary care provider on file.    Indication for procedure: STEMI    Procedure:  · Insertion of 6FR sheath in the right radial artery  · Bilateral coronary arteriograms  · Angioplasty and placement of a 3.0 by 24 mm Synergy drug-eluting stent in proximal  right coronary artery.  · Angioplasty and placement of a 2.25 by 12 mm Synergy drug-eluting stent in  distal left anterior descending coronary artery.  · Angioplasty and placement of a 2.15 by 12 mm Synergy drug-eluting stent in mid obtuse marginal coronary artery.  · Angioplasty and placement of a 4.0 by 38 mm Synergy drug-eluting stent in proximal right coronary artery.      Flynn Olmedo was brought to the cardiac catheterization lab where the right wrist was prepped and draped in the usual manner for cardiac catheterization.  The area was anesthetized with lidocaine and a 6FR sheath was inserted into the right radial artery without difficulty. A #3.5 left Arabella was advanced to the ostia of the left coronary artery and arteriograms were recorded.   A #4 right Arabella was advanced to the ostia of the right coronary artery and arteriograms were recorded.  Left ventriculogram was not performed.  Patient underwent percutaneous coronary revascularization as outlined below.  At the completion of the case the sheath was removed and hemostasis achieved utilizing radial compression band .  Patient was pain-free and hemodynamically stable at the completion of the case.  There were no apparent complications.    Coronary arteriograms:  Left main: normal  Left anterior descending: Proximal LAD has 99% stenosis, distal LAD has 90% stenosis. Diagonals are small vessels.  Left circumflex: After Giving a Large marginal, LCX is a small vessel with 80% stenosis in mid portion. Marginal has thrombotic lesion with  80% stenosis involving the bifurcation.  Right coronary: 90% stenosis in proximal RCA, long lesion, dominant      Interventional Procedure LAD:     Given the patient's clinical presentation and coronary anatomy, PCI was indicated and we proceeded with the intervention as detailed below.    Proximal:  Pre: 99%, 20 mm length, LES 2 flow  Post: 0%, LES 3 flow    Distal:  Pre: 90%, 10 mm length, LES 3 flow  Post: 0%, LES 3 flow    Guide catheter: EBU 3.5 was advanced to the ostia of the left.    Guide wire: Samurai was advanced into the artery and crossed the lesion.    Balloon pre-dilatation: 3.0 by 12 mm Emerge inflated to 8 IMELDA to pre-dilate the lesion.    Stent: 3.0 by 24 mm Synergy drug-eluting deployed successfully at 12 IMELDA.      Additional stent:2.25 by 12 mm Synergy drug-eluting deployed at 12 IMELDA. This is post dilated with 2.25X6 mm NC balloon at 12 atms.    Additional procedures: none       Interventional Procedure OM:     Given the patient's clinical presentation and coronary anatomy, PCI was indicated and we proceeded with the intervention as detailed below.    Pre: 80%, 10 mm length, LES 3 flow  Post: 0%, LES 3 flow    Guide catheter: EBU 3.5 was advanced to the ostia of the left.    Guide wire: Samurai was advanced into the artery and crossed the lesion.    Stent: 2.75 by 12 mm Synergy drug-eluting deployed successfully at 12 IMELDA.   side branch was crossed through the stent struts, dilated with 2.0X12mm emerge balloon. Side branch was jailed but good LES 3 flow.    Interventional Procedure RCA:     Given the patient's clinical presentation and coronary anatomy, PCI was indicated and we proceeded with the intervention as detailed below.    Pre: 90%, 30 mm length, LES 3 flow  Post: 0%, LES 3 flow    Guide catheter: JR 4 was advanced to the ostia of the right.    Guide wire: Samurai was advanced into the artery and crossed the lesion.    Stent: 4.0 by 38 mm Synergy drug-eluting deployed successfully  at 12 IMELDA.    Anticoagulant: Heparin  Antiplatelet: Ticagrelor  EBL <25 cc  Complications: none  Specimens: none  Contrast: see cath lab flowsheet  Fluorotime : see cath lab flowsheet    Final diagnosis:   · Multivessel PCI as described above.      Recommendations: Continue DAPT for at least 1 year     Sedation: I supervised moderate sedation over a trained independent observer.  Total sedation time is 43 minutes.    Electronically signed by   Talib Tuttle MD  9/25/2018  3:28 PM

## 2018-09-26 PROBLEM — I21.4 ACUTE NON-ST SEGMENT ELEVATION MYOCARDIAL INFARCTION (HCC): Status: ACTIVE | Noted: 2018-01-01

## 2018-09-26 NOTE — H&P
Hospital Medicine History & Physical Note    Date of Service  9/25/2018    Primary Care Physician  No primary care provider on file.    Consultants  cardiology    Code Status  full    Chief Complaint  Chest pain    History of Presenting Illness  58 y.o. male who presented 9/25/2018 with chest pain. Mr. Olmedo has a history of tobacco dependence though otherwise no known medical conditions that for about the past 2 weeks has noticed chest pain with walking and this improves when he rests.  The past 2 days the pain is been worsening and quite progressive he is also noticed sweating when he exerts himself.  He presented emergency room with these complaints and found as significantly abnormal EKG as well as a markedly elevated troponin of 36 therefore went immediately to the cardiac Cath Lab as received stents x2 to the left anterior descending artery as well as to the mid obtuse marginal coronary artery and right coronary artery.  In the cardiac intensive care unit, patient is now chest pain-free and his sister and mother are at bedside.    Review of Systems  Review of Systems   Constitutional: Negative for chills and fever.   Respiratory: Negative for shortness of breath.    Cardiovascular: Positive for chest pain. Negative for palpitations and leg swelling.   All other systems reviewed and are negative.      Past Medical History  No known medical conditions    Surgical History  Right eye prosthesis, nose surgery, and right wrist surgery    Family History  4 of his brothers have had heart attacks his mother had coronary artery disease    Social History   reports that he has been smoking Cigarettes.  He has a 27.00 pack-year smoking history. He does not have any smokeless tobacco history on file. He reports that he uses drugs. He reports that he does not drink alcohol.    Allergies  No Known Allergies    Medications  None       Physical Exam  Temp:  [36.3 °C (97.4 °F)-36.6 °C (97.9 °F)] 36.6 °C (97.8 °F)  Pulse:   [] 103  Resp:  [14-33] 23  BP: ()/(62-72) 99/64    Physical Exam   Constitutional: He is oriented to person, place, and time.   Eyes:   Right prosthetic eye   Neck: Normal range of motion. Neck supple.   No bruits    Cardiovascular:   No murmur heard.  Sinus tachcardia   Pulmonary/Chest: Effort normal. No respiratory distress. He has no wheezes.   Abdominal: Soft. He exhibits no distension. There is no tenderness.   Musculoskeletal: He exhibits no edema or tenderness.   Right radial cath site without hematoma   Neurological: He is alert and oriented to person, place, and time.   Skin: Skin is warm and dry. He is not diaphoretic.   Psychiatric: He has a normal mood and affect. His behavior is normal.   Nursing note and vitals reviewed.      Laboratory:  Recent Labs      09/25/18   1425   WBC  11.1*   RBC  5.24   HEMOGLOBIN  15.7   HEMATOCRIT  45.8   MCV  87.4   MCH  30.0   MCHC  34.3   RDW  41.9   PLATELETCT  264   MPV  9.1     Recent Labs      09/25/18   1326   SODIUM  135   POTASSIUM  4.6   CHLORIDE  102   CO2  25   GLUCOSE  77   BUN  11   CREATININE  0.99   CALCIUM  9.9     Recent Labs      09/25/18   1326   ALTSGPT  38   ASTSGOT  101*   ALKPHOSPHAT  32   TBILIRUBIN  1.0   LIPASE  21   GLUCOSE  77     Recent Labs      09/25/18   1326   APTT  23.1*   INR  0.97     Recent Labs      09/25/18   1425   BNPBTYPENAT  400*         Recent Labs      09/25/18   1326   TROPONINI  36.91*       Urinalysis:    No results found   EKG  Initial EKG with deep T wave inversions anteriorly and laterally  Imaging:  DX-CHEST-LIMITED (1 VIEW)   Final Result      No acute cardiopulmonary abnormality.      EC-ECHOCARDIOGRAM COMPLETE W/O CONT    (Results Pending)         Assessment/Plan:  I anticipate this patient will require at least two midnights for appropriate medical management, necessitating inpatient admission.    * ST elevation myocardial infarction involving left anterior descending (LAD) coronary artery (HCC)-  (present on admission)   Assessment & Plan    STEMI with emergent heart catheterization on 9/25 with stenting of the LAD x 2, mid-obtuse x 1 and RCA x 1  Admit to the CICU  Dual antiplatelet therapy  High intensity statin  Lipid panel ordered for the morning.   Metoprolol initiated.  Echocardiogram ordered   Continuous telemetry monitoring.         Tobacco abuse- (present on admission)   Assessment & Plan    Cessation has been discussed  A nicotine patch has been offered.            VTE prophylaxis: lovenox

## 2018-09-26 NOTE — CARE PLAN
Problem: Knowledge Deficit  Goal: Knowledge of the prescribed therapeutic regimen will improve  Outcome: PROGRESSING AS EXPECTED  Discuss medications, side effects and importance    Problem: Pain Management  Goal: Pain level will decrease to patient's comfort goal  Outcome: PROGRESSING AS EXPECTED  Monitor pain using nursing pain scale, use of pharmacological and non-pharm adjuncts

## 2018-09-26 NOTE — CARE PLAN
Problem: Safety  Goal: Will remain free from falls  Outcome: PROGRESSING AS EXPECTED  Pt educated regarding the use of call light when needing assistance. Pt demonstrated and verbalized the proper use of a call light and to call for assistance. Fall precautions in place, treaded slippers on, personal belongings/phone in reach. Pt has a steady gate ambulating with SBA and is encouraged to call if assistance is needed. Bed alarm is set.

## 2018-09-26 NOTE — ED PROVIDER NOTES
ED Provider Note    CHIEF COMPLAINT  Chief Complaint   Patient presents with   • Chest Pain     x 2 weeks, generalized chest pain radiating down BUE, (+) SOB, denies N/V       HPI  Flynn Olmedo is a 58 y.o. male who presents to emergency room today with complaints of chest pain.  Patient's pain started 2 weeks ago has been on and off became worse today around 10:00 AM prompting to come to the emergency room.  Pain was substernal and radiates down both arms he had no diaphoresis but some shortness of breath no fever chills or changes to bowel bladder.  He has cardiac risk factors of tobacco use, family history denies drug use.  Patient's initial EKG showed some suspicious changes but did not meet criteria for STEMI.  Upon initial examination by ER physician patient had a repeat EKG showing anterior wall ischemic infarct and STEMI was called and patient was taken to the Cath Lab .  Patient severe distress secondary to suspected acute myocardial infarction.  Patient already received aspirin.    REVIEW OF SYSTEMS  See HPI for further details. All other systems are negative.     PAST MEDICAL HISTORY  Past Medical History:   Diagnosis Date   • Myocardial infarct (HCC)        FAMILY HISTORY  [unfilled]    SOCIAL HISTORY  Social History     Social History   • Marital status: Single     Spouse name: N/A   • Number of children: N/A   • Years of education: N/A     Social History Main Topics   • Smoking status: Current Every Day Smoker     Packs/day: 1.00     Years: 27.00     Types: Cigarettes   • Smokeless tobacco: Not on file   • Alcohol use No   • Drug use: Yes      Comment: occassional marijuana    • Sexual activity: Not on file     Other Topics Concern   • Not on file     Social History Narrative   • No narrative on file       SURGICAL HISTORY  No past surgical history on file.    CURRENT MEDICATIONS  Home Medications     Reviewed by Jerica Lobo (Pharmacy Tech) on 09/25/18 at 5859  Med List Status: Complete  "  Medication Last Dose Status        Patient Christiano Taking any Medications                       ALLERGIES  No Known Allergies    PHYSICAL EXAM  VITAL SIGNS: /72   Pulse (!) 105   Temp 36.3 °C (97.4 °F)   Resp 15   Ht 1.753 m (5' 9\")   Wt 80.5 kg (177 lb 7.5 oz)   SpO2 97%   BMI 26.21 kg/m²  Room air O2: 96    Constitutional: Well developed, Well nourished, severe acute distress, Non-toxic appearance.   HENT: Normocephalic, Atraumatic, Bilateral external ears normal, Oropharynx moist, No oral exudates, Nose normal.   Eyes: PERRLA, EOMI, Conjunctiva normal, No discharge.   Neck: Normal range of motion, No tenderness, Supple, No stridor.   Lymphatic: No lymphadenopathy noted.   Cardiovascular: Normal heart rate, Normal rhythm, No murmurs, No rubs, No gallops.   Thorax & Lungs: Normal breath sounds, No respiratory distress, No wheezing, No chest tenderness.   Abdomen: Bowel sounds normal, Soft, No tenderness, No masses, No pulsatile masses.   Skin: Warm, Dry, No erythema, No rash.   Back: No tenderness, No CVA tenderness.     Extremities: Intact distal pulses, No edema, No tenderness, No cyanosis, No clubbing.   Musculoskeletal: Good range of motion in all major joints. No tenderness to palpation or major deformities noted.   Neurologic: Alert & oriented x 3, Normal motor function, Normal sensory function, No focal deficits noted.   Psychiatric: Affect normal, Judgment normal, Mood normal.     EKG  #1 shows  sinus rhythm at 100 bpm, there is some minimal ST elevation anteriorly now criteria for STEMI, T-wave inversion V1 through V4, FL intervals are normal, poor R-wave progression, no diagnostic Q waves.  This is a 12-lead EKG no previous EKG available at this time for comparison.    #2  sinus rhythm at 102 bpm, patient now has ST elevation in lead V3 consistent with anterior wall myocardial infarction, poor R-wave progression, FL intervals are normal, no diagnostic Q waves, is a 12-lead EKG is compared to " EKG #1 patient is suspicious for anterior wall myocardial infarction.    RADIOLOGY/PROCEDURES  DX-CHEST-LIMITED (1 VIEW)   Final Result      No acute cardiopulmonary abnormality.      EC-ECHOCARDIOGRAM COMPLETE W/O CONT    (Results Pending)         COURSE & MEDICAL DECISION MAKING  Pertinent Labs & Imaging studies reviewed. (See chart for details)  Patient is markedly elevated troponin of 36, EKG has evolved to anterior wall myocardial infarction patient was made STEMI and even though he did not meet criteria [place] for this he will be taken to the Cath Lab for intervention.  He is in severe distress secondary to this he was placed on IV heparin and nitroglycerin.  Discussed case with cardiologist has been at bedside discussed case with hospitalist.    FINAL IMPRESSION  1.  Acute anterior wall myocardial infarction  2.  Critical care time of 40 minutes; this includes multiple discussions with cardiology, multiple discussions with hospitalist, review records and discussion treatment plan, interventions as discussed above this does not including procedures.  3.         Electronically signed by: Liang Henning, 9/25/2018 5:21 PM

## 2018-09-26 NOTE — PROGRESS NOTES
Cardiology Follow Up Progress Note    Date of Service  9/26/2018    Attending Physician  No att. providers found    Chief Complaint   Chest pain    HPI  Flynn Olmedo is a 58 y.o. male admitted 9/25/2018 with with ongoing chest discomfort.  He had anterior EKG changes which did not meet criteria for a STEMI.  He went to cardiac catheterization and received multiple stents including 2 to the LAD.    Interim Events  Patient had some mild dyspnea last night but is otherwise been stable.  He has had no recurrent anginal type chest discomfort.    Review of Systems  Review of Systems   Respiratory: Positive for shortness of breath (Mild and intermittent).    Cardiovascular: Positive for chest pain (Localized apical). Negative for leg swelling.       Vital signs in last 24 hours  Temp:  [36.3 °C (97.4 °F)-36.9 °C (98.4 °F)] 36.6 °C (97.8 °F)  Pulse:  [] 89  Resp:  [13-33] 18  BP: ()/(62-72) 99/64    Physical Exam  Physical Exam   Neck: No JVD present.   Cardiovascular: Normal rate and regular rhythm.  Exam reveals no gallop.    No murmur heard.  Pulmonary/Chest: Effort normal. He has no rales.   Abdominal: Soft. There is no tenderness.   Musculoskeletal: He exhibits no edema.       Lab Review  Lab Results   Component Value Date/Time    WBC 11.5 (H) 09/26/2018 03:00 AM    RBC 4.22 (L) 09/26/2018 03:00 AM    HEMOGLOBIN 12.7 (L) 09/26/2018 03:00 AM    HEMATOCRIT 38.7 (L) 09/26/2018 03:00 AM    MCV 90.0 09/26/2018 03:00 AM    MCH 29.6 09/26/2018 03:00 AM    MCHC 32.9 (L) 09/26/2018 03:00 AM    MPV 9.5 09/26/2018 03:00 AM      Lab Results   Component Value Date/Time    SODIUM 135 09/26/2018 03:00 AM    POTASSIUM 3.9 09/26/2018 03:00 AM    CHLORIDE 104 09/26/2018 03:00 AM    CO2 24 09/26/2018 03:00 AM    GLUCOSE 129 (H) 09/26/2018 03:00 AM    BUN 11 09/26/2018 03:00 AM    CREATININE 0.92 09/26/2018 03:00 AM      Lab Results   Component Value Date/Time    ASTSGOT 101 (H) 09/25/2018 01:26 PM    ALTSGPT 38 09/25/2018  01:26 PM     Lab Results   Component Value Date/Time    CHOLSTRLTOT 123 09/26/2018 03:00 AM    LDL 70 09/26/2018 03:00 AM    HDL 37 (A) 09/26/2018 03:00 AM    TRIGLYCERIDE 81 09/26/2018 03:00 AM    TROPONINI 36.91 (H) 09/25/2018 01:26 PM       Recent Labs      09/25/18   1425   BNPBTYPENAT  400*       Cardiac Imaging and Procedures Review  EKG:  My personal interpretation of the EKG dated September 26 is that there is an anterior septal infarction with some residual R wave in lead V3.  T wave inversion is noted from V1 to V5.  Patient is maintaining a sinus rhythm.      Assessment/Plan  * Acute non-ST segment elevation myocardial infarction (HCC): High-grade LAD disease at cardiac catheterization- (present on admission)   Assessment & Plan    Patient presented with chest discomfort and had anterior ST segment elevation.  However, this did not meet definitive criteria for a STEMI.  In addition, his LAD was not totally occluded at cardiac catheterization.  Patient has had some mild dyspnea and his BNP is elevated.  Will start low-dose diuresis though his blood pressure is borderline low.  We will otherwise increase medical therapy as tolerated.            Thank you for allowing me to participate in the care of this patient.  I will continue to follow this patient    Please contact me with any questions.    Fredis High M.D.   Cardiologist, [facility]  [phone]

## 2018-09-26 NOTE — THERAPY
"Physical Therapy Evaluation completed.   Bed Mobility:  Supine to Sit: Supervised  Transfers: Sit to Stand: Supervised  Gait: Level Of Assist: Supervised with No Equipment Needed       Plan of Care: Patient with no further skilled PT needs in the acute care setting at this time  Discharge Recommendations: Equipment: No Equipment Needed.   Patient was seen today for phase I cardiac rehab education s/p stents x 4. Pt was able to get OOB and ambulate using no AD, no assist needed, no balance or strength issues observed. Pt was given phase I handout and all was reviewed including initiation of walking program, pacing, RPE scale and HR monitoring. Phase II cardiac rehab was introduced and pt was encouraged to attend when contacted for this. No further inpt PT needs. Social work to meet with patient regarding options for housing. See \"Rehab Therapy-Acute\" Patient Summary Report for complete documentation.     "

## 2018-09-26 NOTE — PROGRESS NOTES
Monitor summary: pt has been SR.  HR: 70s-90s  Measurements: (16/08/40)  Ectopy: occasional PVC, 6 bts of v-tach    12 hour chart check  2 RN skin check

## 2018-09-26 NOTE — ASSESSMENT & PLAN NOTE
Patient presented with chest discomfort and had anterior ST segment elevation.  However, this did not meet definitive criteria for a STEMI.  In addition, his LAD was not totally occluded at cardiac catheterization.  Patient has had some mild dyspnea and his BNP is elevated.  Will start low-dose diuresis though his blood pressure is borderline low.  We will otherwise increase medical therapy as tolerated.

## 2018-09-26 NOTE — ASSESSMENT & PLAN NOTE
Status post cardiac cath with 4 stents placed.  Cardiology consulting  Monitor on telemetry  Dual antiplatelet therapy, metoprolol, atorvastatin  -see below for echo results  -doing well currently, no tele events, tolerating medications, ambulate this PM

## 2018-09-26 NOTE — PROGRESS NOTES
Also notified Dr. Maxwell of pt's 6 bts of v-tach episode this morning. Magnesium lab draw ordered.

## 2018-09-26 NOTE — PROGRESS NOTES
12 hour chart check   2 RN skin assessment- no problems noted on admit      Monitor summary:   NSR/ST  HR:   .16/.08/.34

## 2018-09-26 NOTE — DIETARY
Nutrition Services: Consult cardiac rehab diet education    Pt is a 58 y.o. Male with Dx: STEMI    Admit day 1.  S/p cardiac cath with placement of 4 stents 9/25.  BMI 26.21  HDL 37  Pt on a cardiac diet with a negative nutritional admit screen.  Attempted diet education, but PT/OT was in with pt.   Left dietary handouts outside of room.    RD will attempt to F/u with pt for diet recommendations prior to D/c.

## 2018-09-26 NOTE — PROGRESS NOTES
[facility] Hospitalist Progress Note    Date of Service: 2018    Chief Complaint  58 y.o. male admitted 2018 with chest pain and taken to cardiac Cath Lab found to have coronary artery obstruction requiring 4 stents.    Interval Problem Update  Alert. Able to speak in full sentences  Chest pain left lateral side non pleuritic  Some runs of Vtach briefly overnight  Oriented x4  Afebrile  On room air.  Cardiac diet  Echo pending.    Consultants/Specialty  Cardiology    Disposition  Transfer to telemetry        Review of Systems   Constitutional: Negative for fever.   HENT: Negative for congestion and sore throat.    Eyes: Negative for blurred vision and double vision.   Respiratory: Negative for cough and shortness of breath.    Cardiovascular: Negative for chest pain, palpitations and leg swelling.   Gastrointestinal: Negative for abdominal pain and nausea.   Genitourinary: Negative for dysuria and urgency.   Musculoskeletal: Negative for back pain.   Skin: Negative for rash.   Neurological: Negative for dizziness, sensory change and weakness.   Psychiatric/Behavioral: Negative for depression. The patient is not nervous/anxious.       Physical Exam  Laboratory/Imaging   Hemodynamics  Temp (24hrs), Av.6 °C (97.8 °F), Min:36.3 °C (97.4 °F), Max:36.9 °C (98.4 °F)   Temperature: 36.6 °C (97.8 °F)  Pulse  Av.4  Min: 77  Max: 109 Heart Rate (Monitored): 87  Blood Pressure: (!) 99/64, NIBP: 106/66      Respiratory      Respiration: 18, Pulse Oximetry: 98 %        RUL Breath Sounds: Clear, RML Breath Sounds: Clear, RLL Breath Sounds: Clear, LOS Breath Sounds: Clear, LLL Breath Sounds: Clear    Fluids    Intake/Output Summary (Last 24 hours) at 18 1434  Last data filed at 18 1300   Gross per 24 hour   Intake             2820 ml   Output             1825 ml   Net              995 ml       Nutrition  Orders Placed This Encounter   Procedures   • Diet Order Cardiac     Standing Status:   Standing      Number of Occurrences:   1     Order Specific Question:   Diet:     Answer:   Cardiac [6]     Physical Exam   Constitutional: He is oriented to person, place, and time. He appears well-developed. No distress.   HENT:   Head: Normocephalic and atraumatic.   Nose: Nose normal.   Eyes: Conjunctivae and EOM are normal. Right eye exhibits no discharge. Left eye exhibits no discharge. No scleral icterus.   Neck: No tracheal deviation present.   Cardiovascular: Normal rate, regular rhythm, normal heart sounds and intact distal pulses.    No murmur heard.  Pulmonary/Chest: Effort normal and breath sounds normal. No stridor. No respiratory distress. He has no wheezes.   Abdominal: Soft. He exhibits no distension. There is no tenderness.   Musculoskeletal: He exhibits no edema.   Neurological: He is alert and oriented to person, place, and time. No cranial nerve deficit.   Skin: Skin is warm. He is not diaphoretic.   Psychiatric: He has a normal mood and affect. His behavior is normal. Thought content normal.   Vitals reviewed.      Recent Labs      09/25/18   1425  09/26/18   0300   WBC  11.1*  11.5*   RBC  5.24  4.22*   HEMOGLOBIN  15.7  12.7*   HEMATOCRIT  45.8  38.7*   MCV  87.4  90.0   MCH  30.0  29.6   MCHC  34.3  32.9*   RDW  41.9  43.0   PLATELETCT  264  220   MPV  9.1  9.5     Recent Labs      09/25/18   1326  09/26/18   0300   SODIUM  135  135   POTASSIUM  4.6  3.9   CHLORIDE  102  104   CO2  25  24   GLUCOSE  77  129*   BUN  11  11   CREATININE  0.99  0.92   CALCIUM  9.9  9.2     Recent Labs      09/25/18   1326   APTT  23.1*   INR  0.97     Recent Labs      09/25/18   1425   BNPBTYPENAT  400*     Recent Labs      09/26/18   0300   TRIGLYCERIDE  81   HDL  37*   LDL  70          Assessment/Plan     * Acute non-ST segment elevation myocardial infarction (HCC): High-grade LAD disease at cardiac catheterization- (present on admission)   Assessment & Plan    Status post cardiac cath with 4 stents placed.  Cardiology  consulting  Monitor on telemetry  Dual antiplatelet therapy, metoprolol, atorvastatin  Await echocardiogram  Smoking cessation  Healthy diet discussed        Tobacco abuse- (present on admission)   Assessment & Plan    Discussed importance of smoking cessation.  Educated patient on atherosclerotic disease worsened with tobacco.  Educated patient on benefits of smoking cessation and heart disease  Nicotine patch if needed  3 minutes spent on smoking cessation discussion          Quality-Core Measures

## 2018-09-26 NOTE — CARE PLAN
"Problem: Knowledge Deficit  Goal: Knowledge of disease process/condition, treatment plan, diagnostic tests, and medications will improve  Outcome: PROGRESSING AS EXPECTED  Discussed POC with pt's daughter and included: medications, frequent monitoring, test and lab draws. Questions and concerns addressed.       Problem: Pain Management  Goal: Pain level will decrease to patient's comfort goal  Outcome: PROGRESSING SLOWER THAN EXPECTED  Pt verbalized his chest pain did not resolve after cath procedure, but that it is \"getting better\". Pt educated to call RN if chest pain changes or increases in severity.       "

## 2018-09-26 NOTE — PROGRESS NOTES
"Dr. Maxwell notified of pt' feeling nauseas at this time, chest pain is \"about the same as it has been at 6/10\". Also notified of BP 80s/60s with MAP >65. Ordered to get EKG, 500 mL NS bolus and holding parameters for metoprolol. T/O RBV.   "

## 2018-09-26 NOTE — PROGRESS NOTES
TR band removed at 2145, hemostasis achieved and site is soft. Guaze and transparent dressing applied.

## 2018-09-27 PROBLEM — I50.21 ACUTE SYSTOLIC CONGESTIVE HEART FAILURE (HCC): Status: ACTIVE | Noted: 2018-01-01

## 2018-09-27 NOTE — TELEPHONE ENCOUNTER
[facility]    Meds-to-Bed counseling session  -Verified pt by checking wrist bracelet and confirming pt name and .  -Explained why I was there ( on new medication Brilinta).  -Explain importance of taking Brilinta to prevent platelets from adhering to stents.  -Followed OBRA-90 counseling requirements.  -Educated on SOB (usually self-resolves w/in a few weeks, but to contact doctor if unable to tolerate).  -Educated on risk of bleeding (bruising, blood in urine/stool, nose bleeding, gum bleeding) d/t antiplatelet medication and to report any bleeding to doctor or seek medical attention.      Shaka Prado, Pharmacy Intern    Panchito Griggs, PharmD

## 2018-09-27 NOTE — PROGRESS NOTES
Report received by NOC RN. Assumed care of pt. Assessment complete. Personal items at bedside. Pt A&O x 4. Pt has right radial insertion site CDI-good CMS distal insertion site , denies chest pain or SOB, and pt in a sinus rhythm. Pt in no apparent signs of distress. Plan of care discussed. Call light in reach,  bed in lowest position, and pt has no further questions at this time.

## 2018-09-27 NOTE — CARE PLAN
Problem: Safety  Goal: Will remain free from falls    Intervention: Implement fall precautions  Will do purposeful hourly rounding, will maintain bed alarm engaged

## 2018-09-27 NOTE — DISCHARGE PLANNING
Anticipated Discharge Disposition: Pt is trying to find housing    Action: LSW met with pt and his family at bedside. Discussed d/c planning. Pt states he has been living and staying with friends. Pt states he was living with his dtr but is not longer able to live with her. Pt states he just received his first SSD check and receives approx. $1,600 month. Pt states he is trying to see if he can rent a house from a gentleman he has been talking to but his family needs to look at the house first. Pt states he has Medicaid. Pt states he was IPTA, does not use any DME and uses [facility]. Pt states his PCP is Dr. Jan Flores. Discussed providing resources for low income housing, general resources and if pt does have Medicaid can reach out to Medicaid CM ([facility]) for any other resources. Discussed with pt if there are any family members he can stay with until he is able to secure housing and he will see.     Called [facility] and they were able to confirm that pt's 's insurance is no longer valid and were able to confirm pt does have Medicaid HPN.     Barriers to Discharge: N/A.     Plan: As above, pt has HPN Medicaid. Provided resources to pt. Recommend placing a call to Sylvia SIMONS CM for HPN Medicaid in the morning to discuss pt's case and see if they can assist with any housing/resources. Left report for unit MAURICIO Oh.           Care Transition Team Assessment    Information Source  Orientation : Oriented x 4  Information Given By: Patient  Informant's Name: Flynn  Who is responsible for making decisions for patient? : Patient    Readmission Evaluation  Is this a readmission?: No    Elopement Risk  Legal Hold: No  Ambulatory or Self Mobile in Wheelchair: Yes  Disoriented: No  Psychiatric Symptoms: None  History of Wandering: No  Elopement this Admit: No  Vocalizing Wanting to Leave: No  Displays Behaviors, Body Language Wanting to Leave: No-Not at Risk for Elopement  Elopement Risk: Not at Risk for  Elopement    Interdisciplinary Discharge Planning  Primary Care Physician: Jan Flores  Patient or legal guardian wants to designate a caregiver (see row info): No  Housing / Facility: Homeless  Prior Services: None  Durable Medical Equipment: Not Applicable    Discharge Preparedness  What is your plan after discharge?: Uncertain - pending medical team collaboration  What are your discharge supports?: Parent, Child, Other (comment) (pt has support from mother and dtr)  Prior Functional Level: Ambulatory, Drives Self, Independent with Activities of Daily Living, Independent with Medication Management  Difficulity with ADLs: None  Difficulity with IADLs: None    Functional Assesment  Prior Functional Level: Ambulatory, Drives Self, Independent with Activities of Daily Living, Independent with Medication Management    Finances  Financial Barriers to Discharge: No (pt makes $1,600 SSD)  Prescription Coverage: Yes (uses [facility] pharmacy)    Vision / Hearing Impairment  Right Eye Vision: Blind, Wears Glasses  Hearing Impairment : Yes  Hearing Impairment: Patient Declines to Wear Hearing Device(s)  Does Pt Need Special Equipment for the Hearing Impaired?: No    Values / Beliefs / Concerns  Values / Beliefs Concerns : No         Domestic Abuse  Have you ever been the victim of abuse or violence?: No  Physical Abuse or Sexual Abuse: No  Verbal Abuse or Emotional Abuse: No  Possible Abuse Reported to:: Not Applicable         Discharge Risks or Barriers  Discharge risks or barriers?: Complex medical needs, Homeless / couch surfing  Patient risk factors: Complex medical needs, Homeless    Anticipated Discharge Information  Anticipated discharge disposition: Home  Discharge Address: [facility]  Discharge Contact Phone Number: [phone]

## 2018-09-27 NOTE — CARE PLAN
Problem: Venous Thromboembolism (VTW)/Deep Vein Thrombosis (DVT) Prevention:  Goal: Patient will participate in Venous Thrombosis (VTE)/Deep Vein Thrombosis (DVT)Prevention Measures    Intervention: Assess and monitor for anticoagulation complications  IV access points, gums, mucus membranes observed for bleeding. Also assessed for blood in urine or stool or black stool, nose bleeds, severe bruising, back pain, or chest pain.         Problem: Knowledge Deficit  Goal: Knowledge of disease process/condition, treatment plan, diagnostic tests, and medications will improve    Intervention: Assess knowledge level of disease process/condition, treatment plan, diagnostic tests, and medications  Pt  educated on POC, all questions answered in regards to disease process, treatment and diet. Pt  verbalize understanding and voice no further questions at this time. Reviewed HF education.

## 2018-09-27 NOTE — DISCHARGE PLANNING
Anticipated Discharge Disposition: TBD    Action: Received report from LSW Jenna.     Left VM w/ Medicaid HPN CARRIE, Sylvia. LSW requesting housing support for pt upon d/c.    Spoke to pt's current LSWLizzie.    Barriers to Discharge: TBD    Plan: f/u w/ CM, medical team

## 2018-09-27 NOTE — PROGRESS NOTES
Cardiology Follow Up Progress Note    Date of Service  9/27/2018    Attending Physician  Ricardo Kerr M.D.    Chief Complaint   NSTEMI, presented with chest discomfort     HPI  Flynn Olmedo is a 58 y.o. male admitted 9/25/2018 with chest pain with EKG changes , did not meet criteria for STEMI, was tx to cath lab for continuous chest pain. Trop 36     HX of tobacco use, + family hx of CAD in dad    Interim Events    9/25/18 , LHC, multi vessel PCI,  PCI to  pRCA, PCI to dLAD, PCI to mid OM,  Review of Systems  Review of Systems   Respiratory: Negative for apnea, cough, choking, chest tightness, shortness of breath, wheezing and stridor.    Cardiovascular: Negative for chest pain, palpitations and leg swelling.       Vital signs in last 24 hours  Temp:  [36.4 °C (97.6 °F)-37.3 °C (99.1 °F)] 36.4 °C (97.6 °F)  Pulse:  [] 71  Resp:  [16-18] 16  BP: (105-117)/(62-79) 105/69    Physical Exam  Physical Exam   Constitutional: He is oriented to person, place, and time. He appears well-developed.   HENT:   Head: Normocephalic.   Eyes: Conjunctivae are normal.   Neck: No JVD present. No thyromegaly present.   Cardiovascular: Normal rate and regular rhythm.    Pulses:       Carotid pulses are 2+ on the right side, and 2+ on the left side.       Radial pulses are 2+ on the right side, and 2+ on the left side.        Posterior tibial pulses are 2+ on the right side, and 2+ on the left side.   Pulmonary/Chest: He has no wheezes.   Abdominal: Soft.   Musculoskeletal: He exhibits no edema.   Neurological: He is alert and oriented to person, place, and time.   Skin: Skin is warm and dry.   R radial cath site with no hematoma, no swelling        Lab Review  Lab Results   Component Value Date/Time    WBC 11.5 (H) 09/27/2018 03:13 AM    RBC 4.63 (L) 09/27/2018 03:13 AM    HEMOGLOBIN 14.1 09/27/2018 03:13 AM    HEMATOCRIT 41.5 (L) 09/27/2018 03:13 AM    MCV 89.6 09/27/2018 03:13 AM    MCH 30.5 09/27/2018 03:13 AM    [facility]  34.0 09/27/2018 03:13 AM    MPV 9.7 09/27/2018 03:13 AM      Lab Results   Component Value Date/Time    SODIUM 136 09/27/2018 03:13 AM    POTASSIUM 4.3 09/27/2018 03:13 AM    CHLORIDE 101 09/27/2018 03:13 AM    CO2 27 09/27/2018 03:13 AM    GLUCOSE 113 (H) 09/27/2018 03:13 AM    BUN 14 09/27/2018 03:13 AM    CREATININE 1.16 09/27/2018 03:13 AM      Lab Results   Component Value Date/Time    ASTSGOT 101 (H) 09/25/2018 01:26 PM    ALTSGPT 38 09/25/2018 01:26 PM     Lab Results   Component Value Date/Time    CHOLSTRLTOT 123 09/26/2018 03:00 AM    LDL 70 09/26/2018 03:00 AM    HDL 37 (A) 09/26/2018 03:00 AM    TRIGLYCERIDE 81 09/26/2018 03:00 AM    TROPONINI 36.91 (H) 09/25/2018 01:26 PM       Recent Labs      09/25/18   1425   BNPBTYPENAT  400*           Assessment/plan    S/p multi vessel PCI 9/25/18 , stable, no angina  EF 25%  NO HR failure symptoms   Diuresed almost one liter overnight   NO dyspnea   I reduced lasix to once a day and increased Spironolactone to 25 mg , in addition added low dose lisinopril with BP parameters   Continue with ASA 81 , Lipitor 80 mg, Ticagrelor 90 mg , and Toprol XL 50 mg  Standing weight today  Appointment with our cardiology office on 10/3/18  Will discuss with JESSICA about  DC planning

## 2018-09-27 NOTE — PROGRESS NOTES
REC'D BEDSIDE REPORT FROM MIRIAN SIMONS *. PT AOX4  . PT DENIES PAIN, SHORTNESS OF BREATH. PLAN OF CARE AND FALL PRECAUTIONS REVIEWED WITH PT. PT DID VERBALIZE UNDERSTANDING, BED ALARM ENGAGED, CALL MORALES LEFT IN REACH

## 2018-09-27 NOTE — DISCHARGE PLANNING
Anticipated Discharge Disposition: Home with girlfriend     Action: LSW informed Pt plans to stay with girlfriend upon discharge, LSW [facility] rg[facility] - ticagrelor (BRILINTA) 90 MG Tab tablet, LSW confirmed RX has been processed and NO copay required.     Barriers to Discharge: none     Plan: Pt to  RX upon discharge

## 2018-09-27 NOTE — PROGRESS NOTES
Handoff report received from KRISTYN Pena. Assumed patient care. Pt is AAOx4. Tele monitor placed and PT is  bpm per monitor. Safety precautions in place. Call light and personal belongings within reach. Educated to call for assistance if needed.

## 2018-09-27 NOTE — PROGRESS NOTES
Cardiovascular Nurse Navigator ([phone]) Note:    Reviewed ACS/PCI medications:  Dual Antiplatelet Therapy (DAPT):  aspirin + ticagrelor  Beta-Blocker:  metoprolol  Statin:  atorvastatin  ACEI/ARB:  lisinopril  Aldosterone blocking agent:   spironolactone    Intensive Cardiac Rehab (ICR) Referral:  Referred on 9/25/18; has current inpatient orders for nutrition consult & PT for Phase I ICR  ICR follow-up and contact info added to AVS:  Yes    Cardiology Follow-Up:  10/3/18    Meds to Beds:  Pt referred to Meds to Beds program to obtain ticagrelor prescription prior to discharge.  Please call [phone] (or [phone] and request 'on-call pharmacist' if after hours) if patient has not received medication at time of discharge.      Inpatient & Discharge Patient Education:  Bedside nursing to continually provide patient education on ACS meds, signs and symptoms to monitor for, and risk factor modification. Also at discharge please complete the “ACS” special instructions on the AVS.  Thank you and please call with questions.

## 2018-09-27 NOTE — PROGRESS NOTES
Report given to receiving RN on 7th floor, Frankie Pt transferred via w/c on tele accompanied by ICU RN and family. Belongings accounted for and sent with patient.

## 2018-09-27 NOTE — PROGRESS NOTES
[facility] Hospitalist Progress Note    Date of Service: 2018    Chief Complaint  58 y.o. male admitted 2018 with chest pain and taken to cardiac Cath Lab found to have coronary artery obstruction requiring 4 stents.    Interval Problem Update  MVCD-doing well, denies any chest pain, TELE with no events. BP on the low side, HR is ok, has not ambulated much today. Spent a long time discussing the importance of tobacco cessation.     Consultants/Specialty  Cardiology    Disposition  Transfer to telemetry        Review of Systems   Constitutional: Negative for chills and fever.   HENT: Negative for hearing loss.    Eyes: Negative for blurred vision.   Respiratory: Negative for cough and shortness of breath.    Cardiovascular: Negative for chest pain and leg swelling.   Gastrointestinal: Negative for abdominal pain, nausea and vomiting.   Genitourinary: Negative for dysuria.   Skin: Negative for itching.   Neurological: Negative for dizziness and sensory change.   Psychiatric/Behavioral: Negative for depression. The patient is not nervous/anxious.    All other systems reviewed and are negative.     Physical Exam  Laboratory/Imaging   Hemodynamics  Temp (24hrs), Av.7 °C (98.1 °F), Min:36.2 °C (97.1 °F), Max:37.3 °C (99.1 °F)   Temperature: 36.2 °C (97.1 °F)  Pulse  Av.7  Min: 60  Max: 109    Blood Pressure: 102/66, NIBP: 115/77      Respiratory      Respiration: 16, Pulse Oximetry: 95 %     Work Of Breathing / Effort: Moderate  RUL Breath Sounds: Clear, RML Breath Sounds: Clear, RLL Breath Sounds: Clear;Diminished, LOS Breath Sounds: Clear, LLL Breath Sounds: Diminished    Fluids    Intake/Output Summary (Last 24 hours) at 18 1358  Last data filed at 18 0900   Gross per 24 hour   Intake              180 ml   Output              450 ml   Net             -270 ml       Nutrition  Orders Placed This Encounter   Procedures   • Diet Order Cardiac     Standing Status:   Standing     Number of  Occurrences:   1     Order Specific Question:   Diet:     Answer:   Cardiac [6]     Physical Exam   Constitutional: He is oriented to person, place, and time. He appears well-developed. No distress.   Appears older than stated age   HENT:   Head: Normocephalic and atraumatic.   Nose: Nose normal.   Eyes: Conjunctivae and EOM are normal. Right eye exhibits no discharge. Left eye exhibits no discharge.   Cardiovascular: Normal rate, regular rhythm, normal heart sounds and intact distal pulses.  Exam reveals no gallop and no friction rub.    No murmur heard.  Pulmonary/Chest: Effort normal and breath sounds normal. No stridor. No respiratory distress.   Abdominal: Soft. There is no tenderness.   Musculoskeletal: He exhibits no edema or tenderness.   Neurological: He is alert and oriented to person, place, and time. Coordination normal.   Skin: Skin is warm. He is not diaphoretic. No erythema.   Psychiatric: He has a normal mood and affect. His behavior is normal. Thought content normal.   Vitals reviewed.      Recent Labs      09/25/18   1425  09/26/18   0300  09/27/18   0313   WBC  11.1*  11.5*  11.5*   RBC  5.24  4.22*  4.63*   HEMOGLOBIN  15.7  12.7*  14.1   HEMATOCRIT  45.8  38.7*  41.5*   MCV  87.4  90.0  89.6   MCH  30.0  29.6  30.5   MCHC  34.3  32.9*  34.0   RDW  41.9  43.0  43.7   PLATELETCT  264  220  242   MPV  9.1  9.5  9.7     Recent Labs      09/25/18   1326  09/26/18   0300  09/27/18   0313   SODIUM  135  135  136   POTASSIUM  4.6  3.9  4.3   CHLORIDE  102  104  101   CO2  25  24  27   GLUCOSE  77  129*  113*   BUN  11  11  14   CREATININE  0.99  0.92  1.16   CALCIUM  9.9  9.2  9.9     Recent Labs      09/25/18   1326   APTT  23.1*   INR  0.97     Recent Labs      09/25/18   1425   BNPBTYPENAT  400*     Recent Labs      09/26/18   0300   TRIGLYCERIDE  81   HDL  37*   LDL  70          Assessment/Plan     * Acute non-ST segment elevation myocardial infarction (HCC): High-grade LAD disease at cardiac  catheterization- (present on admission)   Assessment & Plan    Status post cardiac cath with 4 stents placed.  Cardiology consulting  Monitor on telemetry  Dual antiplatelet therapy, metoprolol, atorvastatin  -see below for echo results  -doing well currently, no tele events, tolerating medications, ambulate this PM        Acute systolic congestive heart failure (HCC)- (present on admission)   Assessment & Plan    -2/2 recent STEMI, no LV thrombus  -volume status on physical exam is ok right now  -decrease lasix today  -start low dose ACE and see how BP does  -continue toprol XL 50 mg daily and aldactone 25 mg daily        Tobacco abuse- (present on admission)   Assessment & Plan    Extensive discussion about tobacco cessation          Quality-Core Measures   Reviewed items::  Labs reviewed and Medications reviewed  Garrido catheter::  No Garrido  DVT prophylaxis pharmacological::  Enoxaparin (Lovenox)  Assessed for rehabilitation services:  Patient returned to prior level of function, rehabilitation not indicated at this time

## 2018-09-27 NOTE — CARE PLAN
Problem: Knowledge Deficit  Goal: Knowledge of disease process/condition, treatment plan, diagnostic tests, and medications will improve    Intervention: Explain information regarding disease process/condition, treatment plan, diagnostic tests, and medications and document in education  Will encourage pt o follow through with prescribed treatment plan after discharge. Encouraged pt to voice concerns and or feelings'

## 2018-09-27 NOTE — PROGRESS NOTES
2 RN skin assessment complete with Rudy SIMONS.   Skin intact no open sores or wounds.  Dry scab to right elbow.

## 2018-09-27 NOTE — ASSESSMENT & PLAN NOTE
-2/2 recent STEMI, no LV thrombus  -volume status on physical exam is ok right now  -decrease lasix today  -start low dose ACE and see how BP does  -continue toprol XL 50 mg daily and aldactone 25 mg daily

## 2018-09-28 PROBLEM — I21.4 ACUTE NON-ST SEGMENT ELEVATION MYOCARDIAL INFARCTION (HCC): Status: RESOLVED | Noted: 2018-01-01 | Resolved: 2018-01-01

## 2018-09-28 NOTE — DISCHARGE SUMMARY
Discharge Summary    CHIEF COMPLAINT ON ADMISSION  Chief Complaint   Patient presents with   • Chest Pain     x 2 weeks, generalized chest pain radiating down BUE, (+) SOB, denies N/V       Reason for Admission  CP     Admission Date  9/25/2018    CODE STATUS  Prior    HPI & HOSPITAL COURSE  This is a 58 y.o. male here with above medical issues. Patient was found to have a STEMI, code was activated and he was emergently taken to the CATH where the following was found and done:    Procedure:  · Insertion of 6FR sheath in the right radial artery  · Bilateral coronary arteriograms  · Angioplasty and placement of a 3.0 by 24 mm Synergy drug-eluting stent in proximal  right coronary artery.  · Angioplasty and placement of a 2.25 by 12 mm Synergy drug-eluting stent in  distal left anterior descending coronary artery.  · Angioplasty and placement of a 2.15 by 12 mm Synergy drug-eluting stent in mid obtuse marginal coronary artery.  · Angioplasty and placement of a 4.0 by 38 mm Synergy drug-eluting stent in proximal right coronary artery.     Coronary arteriograms:  Left main: normal  Left anterior descending: Proximal LAD has 99% stenosis, distal LAD has 90% stenosis. Diagonals are small vessels.  Left circumflex: After Giving a Large marginal, LCX is a small vessel with 80% stenosis in mid portion. Marginal has thrombotic lesion with 80% stenosis involving the bifurcation.  Right coronary: 90% stenosis in proximal RCA, long lesion, dominant     Patient was found to have a A1c of 5.9. Hist ECHO showed global hypokinesis and an EF of 25%, indicating new onset systolic CHF. Patient tolerated the above procedure well. He was started on multiple new cardiac medications as outlined below to achieve good effect in both heart rate and blood pressure. Patient had extensive counseling done for smoking cessation and diet and exercise. He might benefit from CABG in the future.       Therefore, he is discharged in fair and stable  condition to home with close outpatient follow-up.    The patient met 2-midnight criteria for an inpatient stay at the time of discharge.    Discharge Date  9/28/2018    FOLLOW UP ITEMS POST DISCHARGE  F/U with cards in 1-2 weeks  F/U with his PCP in 1-2 weeks    DISCHARGE DIAGNOSES  Principal Problem (Resolved):    Acute non-ST segment elevation myocardial infarction (HCC): High-grade LAD disease at cardiac catheterization POA: Yes  Active Problems:    Tobacco abuse POA: Yes    Acute systolic congestive heart failure (HCC) POA: Yes      FOLLOW UP  Future Appointments  Date Time Provider Department Center   10/3/2018 12:40 PM RICHARDSON Birmingham [facility] None   10/11/2018 2:45 PM Jan Aguilar M.D. [facility] None     [facility]  [address]  [address]  [facility] [address]  [phone]  Schedule an appointment as soon as possible for a visit  Your doctor has referred you to intensive cardiac rehab; please call to make an appointment      MEDICATIONS ON DISCHARGE     Medication List      START taking these medications      Instructions   aspirin 81 MG EC tablet   Take 1 Tab by mouth every day.  Dose:  81 mg     atorvastatin 80 MG tablet  Commonly known as:  LIPITOR   Take 1 Tab by mouth every bedtime.  Dose:  80 mg     furosemide 20 MG Tabs  Commonly known as:  LASIX   Take 1 Tab by mouth every day.  Dose:  20 mg     lisinopril 2.5 MG Tabs  Commonly known as:  PRINIVIL   Take 1 Tab by mouth every day.  Dose:  2.5 mg     metoprolol SR 50 MG Tb24  Commonly known as:  TOPROL XL   Take 1 Tab by mouth every evening.  Dose:  50 mg     spironolactone 25 MG Tabs  Commonly known as:  ALDACTONE   Take 1 Tab by mouth every day.  Dose:  25 mg     ticagrelor 90 MG Tabs tablet  Commonly known as:  BRILINTA   Doctor's comments:  Meds to beds  Take 1 Tab by mouth 2 Times a Day.  Dose:  90 mg            Allergies  No Known Allergies    DIET  Low fat, low sugar, heart healthy    ACTIVITY  As  tolerated except for new strenuous activity for 7 days  Weight bearing as tolerated    CONSULTATIONS  cards    PROCEDURES  See above    EC-ECHOCARDIOGRAM COMPLETE W/O CONT   Final Result      DX-CHEST-LIMITED (1 VIEW)   Final Result      No acute cardiopulmonary abnormality.        ECHO-  * Severely reduced left ventricular systolic function.  * Left ventricular ejection fraction is visually estimated to be 25%.  * There is global hypokinesis with akinesis of the entire anteroseptum,   mid anterior walls, distal anterior, inferior, and septal walls, and   apex.  * Spontaneous Contrast seen in LV, no visible thrombus.   * Normal right ventricular size and systolic function.  * Moderate, eccentric posteriorly directed mitral regurgitation.  * Right heart pressures are normal.     LABORATORY  Lab Results   Component Value Date    SODIUM 135 09/28/2018    POTASSIUM 4.1 09/28/2018    CHLORIDE 101 09/28/2018    CO2 24 09/28/2018    GLUCOSE 118 (H) 09/28/2018    BUN 21 09/28/2018    CREATININE 0.99 09/28/2018        Lab Results   Component Value Date    WBC 11.5 (H) 09/27/2018    HEMOGLOBIN 14.1 09/27/2018    HEMATOCRIT 41.5 (L) 09/27/2018    PLATELETCT 242 09/27/2018        Total time of the discharge process exceeds 39 minutes.

## 2018-09-28 NOTE — PROGRESS NOTES
Pt asleep, but arousable appears to be in no distress, bed alarm is engaged, call bell is in reach

## 2018-09-28 NOTE — HEART FAILURE PROGRAM
Cardiovascular Nurse Navigator () Advanced Heart Failure Program Inpatient Progress Note:    This patient was admitted  9/25 but HF was not added to his problems list until 9/27 and he therefore did not appear on reporting until today, 9/28. Also s/p PCI. I see that patient was discharged per RN note at 1024.  Oct 03, 2018 12:40 PM PDT  Hospital Follow up with RADHA Birmingham.  [facility] (--) [address] [place] [address] [address]  [facility] [id number] [phone]   Oct 11, 2018  2:45 PM PDT  New Patient with Jan Aguilar M.D.  [facility] (--) [address] [place] [address]  [address]  [facility] [id number] [phone]           Thank you and please call TJ Dias with questions M-F

## 2018-09-28 NOTE — EEG PROGRESS NOTE
Bedside report received RN to RN with patient. Assuming care [phone].  Patient sleeping at this time

## 2018-09-28 NOTE — PROGRESS NOTES
REC'D BEDSIDE REPORT FROM BREANNE RN*. PT AOX4. PT DENIES PAIN, SHORTNESS OF BREATH. PLAN OF CARE AND FALL PRECAUTIONS REVIEWED WITH PT. PT DID VERBALIZE UNDERSTANDING, BED ALARM ENGAGED, CALL MORALES LEFT IN REACH

## 2018-09-28 NOTE — DISCHARGE INSTRUCTIONS
Diet low fat,low sugar, heart healthy with 9-13 servings of fruits and vegetables   Activities as tolerated   Follow ups with PCP in 7-10 days, call for appointment   Meds per med rec sheet   No smoking, no alcohol, no caffeine   Wear seat belt in motorized vehicle   Take medications as perscribed   Keep appointments   If symptoms worsen call PCP, 911 or urgent care.    Beta-Blocker Metoprolol is used to lower blood pressure and heart rate, and/or helps your heart heal after a heart attack.    Angiotensin Converting Enzyme Inhibitor Lisinopril is used to lower blood pressure and treat heart failure.    Statin Atorvastatin is used to lower cholesterol.    Discharge Instructions    Discharged to home by car with relative. Discharged via wheelchair, hospital escort: Yes.  Special equipment needed: Not Applicable    Be sure to schedule a follow-up appointment with your primary care doctor or any specialists as instructed.     Discharge Plan:   Diet Plan: Discussed  Activity Level: Discussed  Smoking Cessation Offered: Patient Counseled  Confirmed Follow up Appointment: Appointment Scheduled  Medication Reconciliation Updated: Yes  Pneumococcal Vaccine Administered/Refused: Given (See MAR)  Influenza Vaccine Indication: Indicated: 9 to 64 years of age  Influenza Vaccine Given - only chart on this line when given: Influenza Vaccine Given (See MAR)    I understand that a diet low in cholesterol, fat, and sodium is recommended for good health. Unless I have been given specific instructions below for another diet, I accept this instruction as my diet prescription.   Other diet: Cardiac    Special Instructions: Diagnosis:  Acute Coronary Syndrome (ACS) is a diagnosis that encompasses cardiac-related chest pain and heart attack. ACS occurs when the blood flow to the heart muscle is severely reduced or cut off completely due to a slow process called atherosclerosis.  Atherosclerosis is a disease in which the coronary arteries  become narrow from a buildup of fat, cholesterol, and other substances that combine to form plaque. If the plaque breaks, a blood clot will form and block the blood flow to the heart muscle. This lack of blood flow can cause damage or death to the heart muscle which is called a heart attack or Myocardial Infarction (MI). There are two different types of MIs:  ST Elevation Myocardial Infarction or STEMI (the most severe type of heart attack) and Non-ST Elevation Myocardial Infarction or NSTEMI.    Treatment Plan:  · Cardiac Diet  - Low fat, low salt, low cholesterol   · Cardiac Rehab  - Your doctor has ordered you a referral to [facility].  Call [phone] to schedule an appointment.  · Attend my follow-up appointment with my Cardiologist.  · Take my medications as prescribed by my doctor  · Exercise daily  · Quit Smoking, Lower my bad cholesterol and raise my good cholesterol, lower my blood pressure, Reduce stress and Control my diabetes    Medications:  Certain medications are used to treat ACS.  Remember to always take medications as prescribed and never stop talking medications unless told by your doctor.    You have been prescribed the following medicatons:    Aspirin - Aspirin is used as a blood thinning medication and you will require this medication indefinitely.  Anti-platelet/blood thinner - Your Anti-platelet/Blood thinning medication is used in combination with aspirin to prevent clots from forming in your heart and/or around your stent.  Your doctor will determine how long you need to be on this medicine.  Beta-Blocker - Beta-Blocker  is used to lower blood pressure and heart rate, and/or helps your heart heal after a heart attack.  Statin - Statin is used to lower cholesterol.    · Is patient discharged on Warfarin / Coumadin?   No     Depression / Suicide Risk    As you are discharged from this [facility] facility, it is important to learn how to keep safe from harming yourself.    Recognize the  warning signs:  · Abrupt changes in personality, positive or negative- including increase in energy   · Giving away possessions  · Change in eating patterns- significant weight changes-  positive or negative  · Change in sleeping patterns- unable to sleep or sleeping all the time   · Unwillingness or inability to communicate  · Depression  · Unusual sadness, discouragement and loneliness  · Talk of wanting to die  · Neglect of personal appearance   · Rebelliousness- reckless behavior  · Withdrawal from people/activities they love  · Confusion- inability to concentrate     If you or a loved one observes any of these behaviors or has concerns about self-harm, here's what you can do:  · Talk about it- your feelings and reasons for harming yourself  · Remove any means that you might use to hurt yourself (examples: pills, rope, extension cords, firearm)  · Get professional help from the community (Mental Health, Substance Abuse, psychological counseling)  · Do not be alone:Call your Safe Contact- someone whom you trust who will be there for you.  · Call your local CRISIS HOTLINE 455-4658 or 401-498-1752  · Call your local Children's Mobile Crisis Response Team [place] (556) 160-1585 or www.[employer]  · Call the toll free National Suicide Prevention Hotlines   · National Suicide Prevention Lifeline 978-708-FQZW (4991)  · National Hope Line Network 800-SUICIDE (208-2740)

## 2018-09-28 NOTE — PROGRESS NOTES
Discharge instructions reviewed with patient. IV removed. Prescriptions given.  All questions answered. All belongings with patient. Patient verbalizes understanding of instructions given.

## 2018-09-30 NOTE — ED NOTES
1706- patient arrived to [facility] CPR in progress, patient intubated: 15 min total, shock x3 consistent pulseless v-fib, 3 rounds of epi, last dose given at 1702, total 300mg amioderone given, IO in place left tib, al prior to patient arrival  1709- EPI LAC entit 32  1711- pulseless v-fib-shock 200J  1713- plse check pulseless v-fib, shock 200J, lidocaine 100mg, entit 35  1714- CaCl 1g  1715- pulse check  1716- Bicarb  1717- pulse check, pulseless v-fib, shock 200J   1719- pulse check pulseless v-fib   1720- ERP called time of Death, moment of silence given

## 2018-09-30 NOTE — ED NOTES
Spoke with Donya at [facility] states they will call back and to not release to  home until they do. States pt can go to [facility] in the meantime. Reference number is [id number].

## 2018-09-30 NOTE — ED NOTES
Spoke with Angélica Lowery at [facility], states pt is a candidate for eye care. pts family still in room. pts daughter (next of kin) also in room. Phone number is [phone].

## 2018-09-30 NOTE — DISCHARGE PLANNING
This SW and PM SW met with pts daughter and other family members in the family room. SW provided an update and emotional support. SW and PM SW escorted pt to bedside. PM SW to continue support and provide after life information.

## 2018-09-30 NOTE — DISCHARGE PLANNING
David arrived to [facility]. SW and ERP met with David in family support room where ERP updated David of pts passing. SW provided extensive support and addressed all questions. David reported that the family would like a alyse to pray for the pt. SW escorted David to the bedside. After spending some time with the pt, SW met with David and provided education on the difficult times brochure. SW provided contact information and encouraged follow up. David reported that he was going to begin updating other family, but requested that SS update Malika should she return the call. SS continued to provide support unit David left. SW updated PM SW who will remain available.

## 2018-09-30 NOTE — DISCHARGE PLANNING
JESSICA responded to acute medical pt. Pt was BIB [facility] after becoming unresponsive at [facility]. SW obtained pts wallet and confirmed name and . JESSICA looked at pts facesheet and called 1st emergency contact and brother, David ([phone]). JESSICA was able to inform David that pt was here and in critical condition before the call disconnected. JESSICA attempted to call back several times, but the call went straight to . JESSICA left a  with contact information and a request to come to [facility]. JESSICA called pts next emergency contact and daughter, Patrick ([phone]). The call went straight to , so JESSICA left an urgent message requesting a call back. JESSICA updated ERP and will continue attempts to reach pts family.

## 2018-09-30 NOTE — PROGRESS NOTES
Spiritual Care Note           Patient's Name: Flynn Olmedo   MRN: [id number]    Age and Gender: 58 y.o. male   YOB: 1960   Place of Residence: [place], [place]   Unit: Emergency   Room (and Bed): [facility]   Ethnicity or Nationality: white   Medical Diagnosis: cardiac arrest   Faith Affiliation: unknown   Code Status: Prior    Date of Admission: 2018    Length of Stay: 0 days   Date of Visit: 2018        Situation/Reason for Visit:  Received call from Emilie Rowe, , that family was requesting a .    Background:  Patient had already .  Family was requesting a  for final prayer and blessing.    Observations:  Patient's body lying on gurney.    Assessment of Cultural/Social/Emotional/Spiritual Issues:  Family grieving, seeking final spiritual rite for patient.    Interventions:  Final prayer of commendation, blessing the body.    Consultations/Referrals:  none    Requests/Recommendations:  none    Continuing Care:  Upon request.      Contact Information:  Chaplain SHIMA Verma  [phone]   [email or web address]

## 2018-09-30 NOTE — DISCHARGE PLANNING
Medical Social Work     The SW received a call from the ER lobby advising SW that pt RUTHIE (daughter) arrived to her her  father. SW met with the pt family in the ER lobby and escorted them to the family room and provided emotional support. SW then escorted family to the pt room. SW also provided the pt daughter with the difficult times packet. SW provided emotional support and answer questions the family had. SW also provided the family with the ER SW number in case they had questions after they leave [facility].     Plan: SW will remain available for pt and family support.

## 2018-09-30 NOTE — ED PROVIDER NOTES
ED Provider Note    Scribed for Shaan Griggs M.D. by Shanta Mills. 9/29/2018  5:05 PM    Means of arrival: EMS  History obtained from: EMS  History limited by: patient's critical condition     CHIEF COMPLAINT  Chief Complaint   Patient presents with   • Cardiac Arrest     CPR in progress, witnessed cardiac arrest at [facility] approximately 1650     HPI  Flynn Olmedo is a 58 y.o. male who presents to the Emergency Department via EMS for evaluation of a cardiac arrest which occurred at 4:40 PM. Per EMS, the patient had a witnessed arrest while at the [facility] today. On EMS arrival, patient was unresponsive and compressions were started. Compressions were performed for 15 minutes prior to arrival. The patient was shocked 3 times and given 2 rounds of epinephrine and 300 mg of Amiodarone prior to arrival. An IO in the left tibia was established prior to arrival as well. On arrival compressions were in progress and patient was unresponsive in persistent ventricular fibrillation.     Further HPI is limited secondary to the patient's critical condition.     REVIEW OF SYSTEMS  Pertinent positives include: cardiac arrest. See history of present illness.    Further ROS is limited secondary to the patient's critical condition.    PAST MEDICAL HISTORY   has a past medical history of Myocardial infarct (HCC).    SURGICAL HISTORY  patient denies any surgical history    SOCIAL HISTORY  Social History   Substance Use Topics   • Smoking status: Current Every Day Smoker     Packs/day: 1.00     Years: 27.00     Types: Cigarettes   • Smokeless tobacco: None noted    • Alcohol use No      History   Drug Use     Comment: occassional marijuana        FAMILY HISTORY  Family History   Problem Relation Age of Onset   • Heart Disease Paternal Grandmother    • Heart Disease Paternal Grandfather        CURRENT MEDICATIONS  Current medications can be reviewed in the nurse's note.     ALLERGIES  No Known Allergies    PHYSICAL  EXAM  VITAL SIGNS: Wt 76.7 kg (169 lb)   BMI 24.96 kg/m²     Constitutional: Patient is unresponsive and compressions are in progress.   HENT: No signs of trauma, Nose normal. Patient has a mouth full of vomit and an ET tube in place.   Eyes: Left eye is open and left pupil is 3 mm and non reactive. Right eye remained closed.   Neck: Supple, No stridor.   Lymphatic: No lymphadenopathy noted.   Cardiovascular: Pulseless. Used bedside US which was negative for pericardial effusion and showed no cardiac activity.   Thorax & Lungs: Bilateral breath sounds noted.   Abdomen: Soft, No peritoneal signs, No masses, No pulsatile masses.   Skin: Warm, Dry, No erythema, No rash. IO in left tibia noted.   Extremities: Pulseless.   Musculoskeletal: No major deformities noted.   Neurologic: Patient is unresponsive and unable to cooperate with a neurological exam.   Psychiatric: Unable to assess.     COURSE & MEDICAL DECISION MAKING  Nursing notes, VS, PMSFHx reviewed in chart.    58 y.o. male p/w chief complaint of a witnessed cardiac arrest in persistent ventricular fibrillation at approximately 4:50 PM this afternoon.    5:05 PM Patient seen and examined at bedside. Compressions are in progress.     5:06 PM Bedside cardiac US performed and showed no cardiac activity.     5:09 PM- Epinephrine given. Patient's glucose was 304.     5:11 PM- Patient was defibrillated at 200 joules. Compressions began again.     5:13 PM- No pulse found on pulse check. Unable to confirm ET tube placement due to vomit in the posterior oropharynx, however, patient's end tidals are in the 30s-40s.     5:13 PM- Patient was defibrillated again at 200 joules and was given Lidocaine 100 mg. Compressions were started again.    5:14 PM- No pulse found on pulse check. Calcium chloride 1 g was given.     5:15 PM- Patient was found to be in PEA.     5:16 PM- Sodium bicarbonate was given.     5:17 PM- Patient was defibrillated again at 200 joules. Compressions  resumed after.    5:19 PM- No pulse found on pulse check. Patient is still in persistent ventricular fibrillation.     5:20 PM- Time of death: 1720. A moment of silence was given.     5:43 PM- Discussed case with family and informed them of the patient's passing. They understand.     The differential diagnoses include but are not limited to:   Arrhythmia  Hypoglycemia- his glucose was 300.  Respiratory arrest- bilateral breath sounds on exam, no obvious PTX.   Electrolyte abnormality- Patient was given calcium chloride   PE no obvious unilateral leg swelling, no obvious septal bowing on US to suggest large PE or to benefit TPA administration.     Patient arrived with no cardiac activity seen on ultrasound.  Cardiologist at bedside assisting with resuscitated date of efforts, please see their note for additional details.   Bedside echo w/ no e/o pericardial effusion  Already loaded w/ 300mg IV amiodarone  Given 100mg of IV lidocaine as CPR continued  Found to be in VT and was shocked 2 additional times.    Declared  after no ROSC.      DISPOSITION:  Time of death 1720.     FINAL IMPRESSION  1. Cardiac arrest (HCC)         Shanta NUNEZ (Scribe), am scribing for, and in the presence of, Shaan Griggs M.D..    Electronically signed by: Shanta Mills (Scribe), 2018    Shaan NUNEZ M.D. personally performed the services described in this documentation, as scribed by Shanta Mills in my presence, and it is both accurate and complete. C.     The note accurately reflects work and decisions made by me.  Shaan Griggs  2018  11:13 PM

## 2018-09-30 NOTE — CONSULTS
Cardiology Consult Note:    Joey Isaac  Date & Time note created:    2018   5:23 PM     Referring MD:  Dr. Tse    Patient ID:   Name:             Flynn Olmedo   YOB: 1960  Age:                 58 y.o.  male   MRN:               [id number]                                                             Reason for Consult:      CODE BLUE    History of Present Illness:    4-year-old male with no past medical history that is known.  He was found down by the paramedics in the field with an initial rhythm of VT.  He was brought in CODE BLUE during CPR.  In the ER I performed a bedside echocardiogram that showed no organized cardiac activity no dissection and no effusion.  He had Kostas been loaded with 300 mg of IV amiodarone.  We did 100 mg of IV lidocaine.  CPR was continued.  He developed a junctional escape rhythm.  He was pulseless.  CPR was continued.  He was found to be in VT again.  He was shocked 2 more times.  He  was declared .      Review of Systems:      Unable to obtain              Past Medical History:   Past Medical History:   Diagnosis Date   • Myocardial infarct (HCC)      There are no active hospital problems to display for this patient.      Past Surgical History:  No past surgical history on file.    Hospital Medications:  No current facility-administered medications for this encounter.      Current Outpatient Prescriptions   Medication   • aspirin EC 81 MG EC tablet   • atorvastatin (LIPITOR) 80 MG tablet   • furosemide (LASIX) 20 MG Tab   • lisinopril (PRINIVIL) 2.5 MG Tab   • metoprolol SR (TOPROL XL) 50 MG TABLET SR 24 HR   • spironolactone (ALDACTONE) 25 MG Tab   • ticagrelor (BRILINTA) 90 MG Tab tablet         Current Outpatient Medications:    (Not in a hospital admission)    Medication Allergy:  No Known Allergies    Family History:  Family History   Problem Relation Age of Onset   • Heart Disease Paternal Grandmother    • Heart Disease Paternal Grandfather         Social History:  Social History     Social History   • Marital status: Single     Spouse name: N/A   • Number of children: N/A   • Years of education: N/A     Occupational History   • Not on file.     Social History Main Topics   • Smoking status: Current Every Day Smoker     Packs/day: 1.00     Years: 27.00     Types: Cigarettes   • Smokeless tobacco: Not on file   • Alcohol use No   • Drug use: Yes      Comment: occassional marijuana    • Sexual activity: Not on file     Other Topics Concern   • Not on file     Social History Narrative   • No narrative on file         Physical Exam:  Vitals/ General Appearance:   Weight/BMI: There is no height or weight on file to calculate BMI.  There were no vitals taken for this visit.  There were no vitals filed for this visit.  Oxygen Therapy:       Constitutional:   Well developed, Well nourished, No acute distress  HENMT:  Normocephalic, Atraumatic, Oropharynx moist mucous membranes, No oral exudates, Nose normal.  No thyromegaly.  Eyes:  EOMI, Conjunctiva normal, No discharge.  Neck:  Normal range of motion, No cervical tenderness,  no JVD.  Cardiovascular:  Normal heart rate, Normal rhythm, No murmurs, No rubs, No gallops.   Extremitites with intact distal pulses, no cyanosis, or edema.  Lungs:  Normal breath sounds, breath sounds clear to auscultation bilaterally,  no crackles, no wheezing.   Abdomen: Bowel sounds normal, Soft, No tenderness, No guarding, No rebound, No masses, No hepatosplenomegaly.  Skin: Warm, Dry, No erythema, No rash, no induration.  Neurologic: Alert & oriented x 3, No focal deficits noted, cranial nerves II through X are grossly intact.  Psychiatric: Affect normal, Judgment normal, Mood normal.      MDM (Data Review):     Records reviewed and summarized in current documentation    Lab Data Review:  No results found for this or any previous visit (from the past 24 hour(s)).    Imaging/Procedures Review:    Chest Xray:  Reviewed    EKG:    N/A    ECHO:  Per HPI    MDM (Assessment and Plan):     There are no active hospital problems to display for this patient.    58-year-old male with CODE BLUE and refractory VT.  He was declared .  All persons present for the code all agreed that there was no further medical interventions we could perform.      This patient is critically ill with a life threatening illness as noted above requiring my direct presence, involvement and maximal preparedness. I have personally spend 35 minutes providing critical care to this patient. Time spend on critical care excludes time spend on procedures.

## 2018-09-30 NOTE — RESPIRATORY CARE
Pt arrived via EMS, CPR in progress. Pt has an 8.0 ETT at 25cm at the teeth.   Throughout CPR, EtCO2 20-30s with SpO2 30-40%.

## 2018-09-30 NOTE — ED NOTES
Spoke with Joao at [facility], states pt is in jurisdiction of [facility]. Provided this RN with phone number. Attempting to contact now.

## 2018-10-01 NOTE — DISCHARGE PLANNING
Pts daughter arrived to [facility] and requested to meet with SW. SW met with daughter and other family in the family support room. SW addressed all questions regarding after life process. Pts family reported that they are looking into burial assistance and will contact SS upon having a mortuary choice. Upon reporting having no further needs, SW encouraged follow up and will remain available.

## 2018-10-03 NOTE — DOCUMENTATION QUERY
"DOCUMENTATION QUERY    PROVIDERS: Please select “Cosign w/ note” to reply to query.    To better represent the severity of illness of your patient, please review the following information and exercise your independent professional judgment in responding to this query.     Acute non-ST segment elevation myocardial infarction are Anterior wall STEMI are both documented on the DC Summary as well as throughout the entire chart. On Cardiology's consult note, it is noted that \"Patient's initial EKG from 12:54 this afternoon showed a mild sinus tachycardia with deep anterior T-wave inversion.  There was approximately 1 mm of ST-segment elevation, which did not meet criteria for a STEMI.  Repeat EKG at 14:11 this afternoon again reviewed by myself.  This again showed sinus rhythm with a rate of approximately 100 BPM.  There was a QS pattern in leads V1 and V2 with more ST-segment elevation noted in lead V3.  This is greater than 2 mm.  However, he did not have greater than 2 mm of ST-segment elevation in 2 contiguous leads\".       Based upon the clinical findings, risk factors, and treatment, Please clarify the type of myocardial infarction:     Please select all that apply:  • STEMI - Anterior wall  • NSTEMI   • NSTEMI evolved to ST elevation (STEMI) myocardial infarction   • Initial NSTEMI with subsequent STEMI of anterior wall  • Initial; Anterior Wall STEMI with subsequent NSTEMI   • Other explanation of clinical findings (Please document)   • Unable to determine        The medical record reflects the following:   Clinical Findings  Proximal RCA 90% Stenosis   Proximal LAD 99% stenosis   Distal LAD 90% Stenosis    LCX 80% Stenosis    Acute Systolic Heart Failure   Treatment  Coronary angiogram   PCI with 4 JEREMY   Risk Factors  Cigarette Smoker    Personal History of MI   Family History of Cardiovascular Diseas   Location within medical record  History and Physical    DC Summary    Consult     Thank you,   Corazon" Sawallisch

## 2018-10-14 LAB — EKG IMPRESSION: NORMAL
